# Patient Record
Sex: FEMALE | Race: WHITE | NOT HISPANIC OR LATINO | Employment: OTHER | URBAN - NONMETROPOLITAN AREA
[De-identification: names, ages, dates, MRNs, and addresses within clinical notes are randomized per-mention and may not be internally consistent; named-entity substitution may affect disease eponyms.]

---

## 2022-11-03 NOTE — H&P (VIEW-ONLY)
Office New Patient History and Physical:     Referring Provider: Nolberto Pelayo AP*    Chief Complaint   Patient presents with   • Follow-up        Subjective .     History of present illness:  Tiffanie Pelayo is a 77 y.o. female who presented to her PCP with a right breast tumor x 10 years which is enlarging, painful and has drainage. She reports she does not like doctors and has had bad experiences at both hospitals in [place] so she did not seek help. She thought she could control it with diet and does not like conventional breast cancer treatment. She reports it has more than doubled in size in the past few months. She was looking for a homeopathic doctor and found one who recommended thermography. She had that done. She has serosanguinous drainage; enough to wear a pad. She denies pain in the breast. She does not want any chemotherapy or radiation.     Breast History information:   Prior abnormal mammograms: none   Prior breast biopsies: none   Palpable breast masses: yes see above   Nipple discharge: yes see above   Age at first menses: 14  Age at menopause: mid 50s   Number of biological children: 3  Age at first birth: 22  Years on birth control: 10-15 years   Years on HRT: none   Family history of breast cancer: none   Smoking History: non-smoker   Alcohol use: none   BMI: 31     History  History reviewed. No pertinent past medical history.,   Past Surgical History:   Procedure Laterality Date   •  SECTION     • GALLBLADDER SURGERY     • TONSILLECTOMY     , History reviewed. No pertinent family history.,   Social History     Tobacco Use   • Smoking status: Never   • Smokeless tobacco: Never   Vaping Use   • Vaping Use: Never used   Substance Use Topics   • Alcohol use: Never   • Drug use: Never   , (Not in a hospital admission)   and Allergies:  Keflex [cephalexin]    Current Outpatient Medications:   •  acetaminophen (TYLENOL) 160 MG/5ML solution, Take 15 mg/kg by mouth Every 4 (Four) Hours As  "Needed for Mild Pain., Disp: , Rfl:   •  diazePAM (Valium) 2 MG tablet, Take 1 tablet by mouth 1 (One) Time As Needed (before PET scan) for up to 1 dose., Disp: 1 tablet, Rfl: 0    Objective     Vital Signs   /93 (BP Location: Left arm, Patient Position: Sitting, Cuff Size: Adult)   Pulse 76   Ht 152.4 cm (60\")   Wt 72.1 kg (159 lb)   BMI 31.05 kg/m²      Physical Exam:  General appearance - alert, well appearing, and in no distress  Mental status - alert, oriented to person, place, and time  Eyes - pupils equal and reactive, extraocular eye movements intact  Neck - supple, no significant adenopathy  Chest - no tachypnea, retractions or cyanosis  Heart - regular rate   Abdomen - soft, nontender, nondistended, no masses or organomegaly  Neurological - alert, oriented, normal speech, no focal findings or movement disorder noted  Musculoskeletal - no joint tenderness, deformity or swelling  Breast Exam: Left breast without obvious deformities with everted nipple. When examined in the supine position with the ipsilateral arm above the head there are no left breast masses. The right breast has a very large exophytic mass that almost completely replaces the right breast, it is red and purple in coloration. IT is fixed to the chest wall. There is palpable right axillary adenopathy. There are no palpable axillary nor supraclavicular lymph nodes on the left.         Results Review:    The following data was reviewed by: Griselda Arteaga MD on 11/04/2022:     PROGRESS NOTES - SCAN - PN(MAGNUS MOORE, CORBY, FNP-BC)10.17.22 (10/17/2022)    Assessment & Plan       Diagnoses and all orders for this visit:    1. Mass of right breast, unspecified quadrant (Primary)  -     NM PET/CT Skull Base to Mid Thigh; Future  -     diazePAM (Valium) 2 MG tablet; Take 1 tablet by mouth 1 (One) Time As Needed (before PET scan) for up to 1 dose.  Dispense: 1 tablet; Refill: 0  -     Case Request; Standing  -     CBC (No diff); " "Future  -     Comprehensive metabolic panel; Future  -     ECG 12 Lead; Future  -     XR chest 1 vw; Future  -     heparin (porcine) 5000 UNIT/ML injection 5,000 Units  -     clindamycin (CLEOCIN) 600 mg in dextrose (D5W) 5 % 100 mL IVPB  -     Case Request    2. 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, unspecified estrogen receptor status (HCC)  -     NM PET/CT Skull Base to Mid Thigh; Future  -     Case Request; Standing  -     CBC (No diff); Future  -     Comprehensive metabolic panel; Future  -     ECG 12 Lead; Future  -     XR chest 1 vw; Future  -     heparin (porcine) 5000 UNIT/ML injection 5,000 Units  -     clindamycin (CLEOCIN) 600 mg in dextrose (D5W) 5 % 100 mL IVPB  -     Case Request    Other orders  -     Follow Anesthesia Guidelines / Protocol; Future  -     Obtain Informed Consent; Future  -     Provide NPO Instructions to Patient; Future  -     Chlorhexidine Skin Prep; Future  -     Follow Anesthesia Guidelines / Protocol; Standing  -     Verify / Perform Chlorhexidine Skin Prep; Standing  -     Verify / Perform Chlorhexidine Skin Prep if Indicated (If Not Already Completed); Standing  -     Instructions on coughing, deep breathing, and incentive spirometry.; Standing  -     Oxygen Therapy-; Standing  -     Notify physician (specify); Standing         Tiffanie Pelayo is a 77 y.o. female with a large right exophytic breast mass that is adherent to the chest wall. This is highly concerning for long standing breast cancer. She explicitly states she does not trust modern medical therapies or physicians. She needs a metastatic work up however she does not want a \"lot of scans.\" She has agreed to get one scan. We will proceed with a PET scan. If she has metastatic disease, we will plan for a salvage mastectomy with wound vac placement as it is exophytic and bleeding. If she has no evidence of metastatic disease but does have enlarged lymph nodes, we will plan to proceed with modified " radical mastectomy with wound vac placement. She says she will not take chemotherapy or radiation therapy but she will consider anti-endocrine therapy. Ideally we would have a pre-op biopsy, however she only wants one procedure. This is a very complicated case. She is scheduled for a PET scan, CBC, CMP, CXR and EKG. She is scheduled for surgery on 11/16/22 and the exact surgery will depend on the results of the PET scan. She is in agreement with this plan.     BMI is >= 30 and <35. (Class 1 Obesity). The following options were offered after discussion;: weight loss educational material (shared in after visit summary)      Griselda Arteaga MD  11/04/22  11:26 CDT

## 2022-11-03 NOTE — PROGRESS NOTES
Office New Patient History and Physical:     Referring Provider: Nolberto Pelayo AP*    Chief Complaint   Patient presents with   • Follow-up        Subjective .     History of present illness:  Tiffanie Pelayo is a 77 y.o. female who presented to her PCP with a right breast tumor x 10 years which is enlarging, painful and has drainage. She reports she does not like doctors and has had bad experiences at both hospitals in [place] so she did not seek help. She thought she could control it with diet and does not like conventional breast cancer treatment. She reports it has more than doubled in size in the past few months. She was looking for a homeopathic doctor and found one who recommended thermography. She had that done. She has serosanguinous drainage; enough to wear a pad. She denies pain in the breast. She does not want any chemotherapy or radiation.     Breast History information:   Prior abnormal mammograms: none   Prior breast biopsies: none   Palpable breast masses: yes see above   Nipple discharge: yes see above   Age at first menses: 14  Age at menopause: mid 50s   Number of biological children: 3  Age at first birth: 22  Years on birth control: 10-15 years   Years on HRT: none   Family history of breast cancer: none   Smoking History: non-smoker   Alcohol use: none   BMI: 31     History  History reviewed. No pertinent past medical history.,   Past Surgical History:   Procedure Laterality Date   •  SECTION     • GALLBLADDER SURGERY     • TONSILLECTOMY     , History reviewed. No pertinent family history.,   Social History     Tobacco Use   • Smoking status: Never   • Smokeless tobacco: Never   Vaping Use   • Vaping Use: Never used   Substance Use Topics   • Alcohol use: Never   • Drug use: Never   , (Not in a hospital admission)   and Allergies:  Keflex [cephalexin]    Current Outpatient Medications:   •  acetaminophen (TYLENOL) 160 MG/5ML solution, Take 15 mg/kg by mouth Every 4 (Four) Hours As  "Needed for Mild Pain., Disp: , Rfl:   •  diazePAM (Valium) 2 MG tablet, Take 1 tablet by mouth 1 (One) Time As Needed (before PET scan) for up to 1 dose., Disp: 1 tablet, Rfl: 0    Objective     Vital Signs   /93 (BP Location: Left arm, Patient Position: Sitting, Cuff Size: Adult)   Pulse 76   Ht 152.4 cm (60\")   Wt 72.1 kg (159 lb)   BMI 31.05 kg/m²      Physical Exam:  General appearance - alert, well appearing, and in no distress  Mental status - alert, oriented to person, place, and time  Eyes - pupils equal and reactive, extraocular eye movements intact  Neck - supple, no significant adenopathy  Chest - no tachypnea, retractions or cyanosis  Heart - regular rate   Abdomen - soft, nontender, nondistended, no masses or organomegaly  Neurological - alert, oriented, normal speech, no focal findings or movement disorder noted  Musculoskeletal - no joint tenderness, deformity or swelling  Breast Exam: Left breast without obvious deformities with everted nipple. When examined in the supine position with the ipsilateral arm above the head there are no left breast masses. The right breast has a very large exophytic mass that almost completely replaces the right breast, it is red and purple in coloration. IT is fixed to the chest wall. There is palpable right axillary adenopathy. There are no palpable axillary nor supraclavicular lymph nodes on the left.         Results Review:    The following data was reviewed by: Griselda Arteaga MD on 11/04/2022:     PROGRESS NOTES - SCAN - PN(MAGNUS MOORE, CORBY, FNP-BC)10.17.22 (10/17/2022)    Assessment & Plan       Diagnoses and all orders for this visit:    1. Mass of right breast, unspecified quadrant (Primary)  -     NM PET/CT Skull Base to Mid Thigh; Future  -     diazePAM (Valium) 2 MG tablet; Take 1 tablet by mouth 1 (One) Time As Needed (before PET scan) for up to 1 dose.  Dispense: 1 tablet; Refill: 0  -     Case Request; Standing  -     CBC (No diff); " "Future  -     Comprehensive metabolic panel; Future  -     ECG 12 Lead; Future  -     XR chest 1 vw; Future  -     heparin (porcine) 5000 UNIT/ML injection 5,000 Units  -     clindamycin (CLEOCIN) 600 mg in dextrose (D5W) 5 % 100 mL IVPB  -     Case Request    2. 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, unspecified estrogen receptor status (HCC)  -     NM PET/CT Skull Base to Mid Thigh; Future  -     Case Request; Standing  -     CBC (No diff); Future  -     Comprehensive metabolic panel; Future  -     ECG 12 Lead; Future  -     XR chest 1 vw; Future  -     heparin (porcine) 5000 UNIT/ML injection 5,000 Units  -     clindamycin (CLEOCIN) 600 mg in dextrose (D5W) 5 % 100 mL IVPB  -     Case Request    Other orders  -     Follow Anesthesia Guidelines / Protocol; Future  -     Obtain Informed Consent; Future  -     Provide NPO Instructions to Patient; Future  -     Chlorhexidine Skin Prep; Future  -     Follow Anesthesia Guidelines / Protocol; Standing  -     Verify / Perform Chlorhexidine Skin Prep; Standing  -     Verify / Perform Chlorhexidine Skin Prep if Indicated (If Not Already Completed); Standing  -     Instructions on coughing, deep breathing, and incentive spirometry.; Standing  -     Oxygen Therapy-; Standing  -     Notify physician (specify); Standing         Tiffanie Pelayo is a 77 y.o. female with a large right exophytic breast mass that is adherent to the chest wall. This is highly concerning for long standing breast cancer. She explicitly states she does not trust modern medical therapies or physicians. She needs a metastatic work up however she does not want a \"lot of scans.\" She has agreed to get one scan. We will proceed with a PET scan. If she has metastatic disease, we will plan for a salvage mastectomy with wound vac placement as it is exophytic and bleeding. If she has no evidence of metastatic disease but does have enlarged lymph nodes, we will plan to proceed with modified " radical mastectomy with wound vac placement. She says she will not take chemotherapy or radiation therapy but she will consider anti-endocrine therapy. Ideally we would have a pre-op biopsy, however she only wants one procedure. This is a very complicated case. She is scheduled for a PET scan, CBC, CMP, CXR and EKG. She is scheduled for surgery on 11/16/22 and the exact surgery will depend on the results of the PET scan. She is in agreement with this plan.     BMI is >= 30 and <35. (Class 1 Obesity). The following options were offered after discussion;: weight loss educational material (shared in after visit summary)      Griselda Arteaga MD  11/04/22  11:26 CDT

## 2022-11-04 ENCOUNTER — OFFICE VISIT (OUTPATIENT)
Dept: SURGERY | Facility: CLINIC | Age: 77
End: 2022-11-04

## 2022-11-04 VITALS
WEIGHT: 159 LBS | DIASTOLIC BLOOD PRESSURE: 93 MMHG | SYSTOLIC BLOOD PRESSURE: 179 MMHG | HEART RATE: 76 BPM | BODY MASS INDEX: 31.22 KG/M2 | HEIGHT: 60 IN

## 2022-11-04 DIAGNOSIS — C50.611 MALIGNANT NEOPLASM OF AXILLARY TAIL OF RIGHT FEMALE BREAST, UNSPECIFIED ESTROGEN RECEPTOR STATUS: ICD-10-CM

## 2022-11-04 DIAGNOSIS — N63.10 MASS OF RIGHT BREAST, UNSPECIFIED QUADRANT: Primary | ICD-10-CM

## 2022-11-04 PROCEDURE — 99204 OFFICE O/P NEW MOD 45 MIN: CPT | Performed by: STUDENT IN AN ORGANIZED HEALTH CARE EDUCATION/TRAINING PROGRAM

## 2022-11-04 RX ORDER — HEPARIN SODIUM 5000 [USP'U]/ML
5000 INJECTION, SOLUTION INTRAVENOUS; SUBCUTANEOUS ONCE
Status: CANCELLED | OUTPATIENT
Start: 2022-11-04 | End: 2022-11-04

## 2022-11-04 RX ORDER — ACETAMINOPHEN 160 MG/5ML
15 SOLUTION ORAL EVERY 4 HOURS PRN
COMMUNITY
End: 2023-01-23

## 2022-11-04 RX ORDER — DIAZEPAM 2 MG/1
2 TABLET ORAL ONCE AS NEEDED
Qty: 1 TABLET | Refills: 0 | Status: SHIPPED | OUTPATIENT
Start: 2022-11-04 | End: 2022-11-09

## 2022-11-04 NOTE — PATIENT INSTRUCTIONS
Surgery is scheduled for 11/16 at 1200  Prework is scheduled for 11/9 at 115   Clear liquids day of surgery until 1000am   No Aspirin, Vitamins or Blood Thinners 5 days prior to surgery.  Please report to the hospital main registation for check in on both days.     PET Scan  11/7     1030 am  [facility]     BMI for Adults  What is BMI?  Body mass index (BMI) is a number that is calculated from a person's weight and height. BMI can help estimate how much of a person's weight is composed of fat. BMI does not measure body fat directly. Rather, it is an alternative to procedures that directly measure body fat, which can be difficult and expensive.  BMI can help identify people who may be at higher risk for certain medical problems.  What are BMI measurements used for?  BMI is used as a screening tool to identify possible weight problems. It helps determine whether a person is obese, overweight, a healthy weight, or underweight.  BMI is useful for:  Identifying a weight problem that may be related to a medical condition or may increase the risk for medical problems.  Promoting changes, such as changes in diet and exercise, to help reach a healthy weight. BMI screening can be repeated to see if these changes are working.  How is BMI calculated?  BMI involves measuring your weight in relation to your height. Both height and weight are measured, and the BMI is calculated from those numbers. This can be done either in English ([place] or metric measurements. Note that charts and online BMI calculators are available to help you find your BMI quickly and easily without having to do these calculations yourself.  To calculate your BMI in English ([place].S[place] measurements:    Measure your weight in pounds (lb).  Multiply the number of pounds by 703.  For example, for a person who weighs 180 lb, multiply that number by 703, which equals 126,540.  Measure your height in inches. Then multiply that number by itself to get a  "measurement called \"inches squared.\"  For example, for a person who is 70 inches tall, the \"inches squared\" measurement is 70 inches x 70 inches, which equals 4,900 inches squared.  Divide the total from step 2 (number of lb x 703) by the total from step 3 (inches squared): 126,540 ÷ 4,900 = 25.8. This is your BMI.    To calculate your BMI in metric measurements:  Measure your weight in kilograms (kg).  Measure your height in meters (m). Then multiply that number by itself to get a measurement called \"meters squared.\"  For example, for a person who is 1.75 m tall, the \"meters squared\" measurement is 1.75 m x 1.75 m, which is equal to 3.1 meters squared.  Divide the number of kilograms (your weight) by the meters squared number. In this example: 70 ÷ 3.1 = 22.6. This is your BMI.  What do the results mean?  BMI charts are used to identify whether you are underweight, normal weight, overweight, or obese. The following guidelines will be used:  Underweight: BMI less than 18.5.  Normal weight: BMI between 18.5 and 24.9.  Overweight: BMI between 25 and 29.9.  Obese: BMI of 30 or above.  Keep these notes in mind:  Weight includes both fat and muscle, so someone with a muscular build, such as an athlete, may have a BMI that is higher than 24.9. In cases like these, BMI is not an accurate measure of body fat.  To determine if excess body fat is the cause of a BMI of 25 or higher, further assessments may need to be done by a health care provider.  BMI is usually interpreted in the same way for men and women.  Where to find more information  For more information about BMI, including tools to quickly calculate your BMI, go to these websites:  Centers for Disease Control and Prevention: www.cdc.gov  American Heart Association: www.heart.org  National Heart, Lung, and Blood [place]: www.nhlbi.nih.gov  Summary  Body mass index (BMI) is a number that is calculated from a person's weight and height.  BMI may help estimate how " much of a person's weight is composed of fat. BMI can help identify those who may be at higher risk for certain medical problems.  BMI can be measured using English measurements or metric measurements.  BMI charts are used to identify whether you are underweight, normal weight, overweight, or obese.  This information is not intended to replace advice given to you by your health care provider. Make sure you discuss any questions you have with your health care provider.  Document Revised: 09/09/2020 Document Reviewed: 07/17/2020  Elsevier Patient Education © 2021 [employer]

## 2022-11-07 ENCOUNTER — HOSPITAL ENCOUNTER (OUTPATIENT)
Dept: CT IMAGING | Facility: HOSPITAL | Age: 77
Discharge: HOME | End: 2022-11-07

## 2022-11-07 DIAGNOSIS — C50.611 MALIGNANT NEOPLASM OF AXILLARY TAIL OF RIGHT FEMALE BREAST, UNSPECIFIED ESTROGEN RECEPTOR STATUS: ICD-10-CM

## 2022-11-07 DIAGNOSIS — N63.10 MASS OF RIGHT BREAST, UNSPECIFIED QUADRANT: ICD-10-CM

## 2022-11-07 PROCEDURE — A9552 F18 FDG: HCPCS | Performed by: STUDENT IN AN ORGANIZED HEALTH CARE EDUCATION/TRAINING PROGRAM

## 2022-11-07 PROCEDURE — 78815 PET IMAGE W/CT SKULL-THIGH: CPT

## 2022-11-07 PROCEDURE — 0 FLUDEOXYGLUCOSE F18 SOLUTION: Performed by: STUDENT IN AN ORGANIZED HEALTH CARE EDUCATION/TRAINING PROGRAM

## 2022-11-07 RX ADMIN — FLUDEOXYGLUCOSE F18 1 DOSE: 300 INJECTION INTRAVENOUS at 11:13

## 2022-11-09 ENCOUNTER — PRE-ADMISSION TESTING (OUTPATIENT)
Dept: PREADMISSION TESTING | Facility: HOSPITAL | Age: 77
End: 2022-11-09

## 2022-11-09 ENCOUNTER — APPOINTMENT (OUTPATIENT)
Dept: CT IMAGING | Facility: HOSPITAL | Age: 77
End: 2022-11-09

## 2022-11-09 ENCOUNTER — HOSPITAL ENCOUNTER (OUTPATIENT)
Dept: GENERAL RADIOLOGY | Facility: HOSPITAL | Age: 77
Discharge: HOME | End: 2022-11-09

## 2022-11-09 VITALS
HEIGHT: 60 IN | WEIGHT: 158.95 LBS | DIASTOLIC BLOOD PRESSURE: 83 MMHG | OXYGEN SATURATION: 99 % | HEART RATE: 76 BPM | RESPIRATION RATE: 18 BRPM | SYSTOLIC BLOOD PRESSURE: 179 MMHG | BODY MASS INDEX: 31.21 KG/M2

## 2022-11-09 DIAGNOSIS — C50.611 MALIGNANT NEOPLASM OF AXILLARY TAIL OF RIGHT FEMALE BREAST, UNSPECIFIED ESTROGEN RECEPTOR STATUS: ICD-10-CM

## 2022-11-09 DIAGNOSIS — N63.10 MASS OF RIGHT BREAST, UNSPECIFIED QUADRANT: ICD-10-CM

## 2022-11-09 LAB
ALBUMIN SERPL-MCNC: 4.5 G/DL (ref 3.5–5.2)
ALBUMIN/GLOB SERPL: 1.7 G/DL
ALP SERPL-CCNC: 83 U/L (ref 39–117)
ALT SERPL W P-5'-P-CCNC: 14 U/L (ref 1–33)
ANION GAP SERPL CALCULATED.3IONS-SCNC: 7 MMOL/L (ref 5–15)
AST SERPL-CCNC: 19 U/L (ref 1–32)
BILIRUB SERPL-MCNC: 0.3 MG/DL (ref 0–1.2)
BUN SERPL-MCNC: 11 MG/DL (ref 8–23)
BUN/CREAT SERPL: 26.8 (ref 7–25)
CALCIUM SPEC-SCNC: 9.5 MG/DL (ref 8.6–10.5)
CHLORIDE SERPL-SCNC: 106 MMOL/L (ref 98–107)
CO2 SERPL-SCNC: 29 MMOL/L (ref 22–29)
CREAT SERPL-MCNC: 0.41 MG/DL (ref 0.57–1)
DEPRECATED RDW RBC AUTO: 43.1 FL (ref 37–54)
EGFRCR SERPLBLD CKD-EPI 2021: 102.7 ML/MIN/1.73
ERYTHROCYTE [DISTWIDTH] IN BLOOD BY AUTOMATED COUNT: 12.8 % (ref 12.3–15.4)
GLOBULIN UR ELPH-MCNC: 2.6 GM/DL
GLUCOSE SERPL-MCNC: 101 MG/DL (ref 65–99)
HCT VFR BLD AUTO: 39.6 % (ref 34–46.6)
HGB BLD-MCNC: 13.1 G/DL (ref 12–15.9)
MCH RBC QN AUTO: 30.4 PG (ref 26.6–33)
MCHC RBC AUTO-ENTMCNC: 33.1 G/DL (ref 31.5–35.7)
MCV RBC AUTO: 91.9 FL (ref 79–97)
PLATELET # BLD AUTO: 186 10*3/MM3 (ref 140–450)
PMV BLD AUTO: 9.8 FL (ref 6–12)
POTASSIUM SERPL-SCNC: 3.9 MMOL/L (ref 3.5–5.2)
PROT SERPL-MCNC: 7.1 G/DL (ref 6–8.5)
RBC # BLD AUTO: 4.31 10*6/MM3 (ref 3.77–5.28)
SODIUM SERPL-SCNC: 142 MMOL/L (ref 136–145)
WBC NRBC COR # BLD: 6.4 10*3/MM3 (ref 3.4–10.8)

## 2022-11-09 PROCEDURE — 85027 COMPLETE CBC AUTOMATED: CPT

## 2022-11-09 PROCEDURE — 36415 COLL VENOUS BLD VENIPUNCTURE: CPT

## 2022-11-09 PROCEDURE — 80053 COMPREHEN METABOLIC PANEL: CPT

## 2022-11-09 PROCEDURE — 93005 ELECTROCARDIOGRAM TRACING: CPT

## 2022-11-09 PROCEDURE — 93010 ELECTROCARDIOGRAM REPORT: CPT | Performed by: INTERNAL MEDICINE

## 2022-11-09 PROCEDURE — 71045 X-RAY EXAM CHEST 1 VIEW: CPT

## 2022-11-09 RX ORDER — VITAMIN B COMPLEX
1 CAPSULE ORAL DAILY
COMMUNITY

## 2022-11-09 RX ORDER — BETA-GLUCAN, (1-3) (1-4) 70 %
3 POWDER (GRAM) MISCELLANEOUS DAILY
COMMUNITY

## 2022-11-09 NOTE — DISCHARGE INSTRUCTIONS
Before you come to the hospital        Arrival time: AS DIRECTED BY OFFICE     YOU MAY TAKE THE FOLLOWING MEDICATION(S) THE MORNING OF SURGERY WITH A SIP OF WATER: NONE AS DIRECTED BY PROVIDER           ALL OTHER HOME MEDICATION CHECK WITH YOUR PHYSICIAN (especially if you are taking diabetes medicines or blood thinners)    Do not take any Erectile Dysfunction medications (EX: CIALIS, VIAGRA) 24 hours prior to surgery.      If you were given and instructed to use a germ- killing soap, use as directed the night before surgery and again the morning of surgery or as directed by your surgeon.    (See attached information for How to Use Chlorhexidine for Bathing if applicable.)            Eating and drinking restrictions prior to scheduled arrival time    2 Hours before arrival time STOP   Drinking Clear liquids (water, apple juice-no pulp)     6 Hours before arrival time STOP   Milk or drinks that contain milk, full liquids    6 Hours before arrival time STOP   Light meals or foods, such as toast or cereal    8 Hours before arrival time STOP   Heavy foods, such as meat, fried foods, or fatty foods    (It is extremely important that you follow these guidelines to prevent delay or cancelation of your procedure)     Clear Liquids  Water and flavored water                                                                      Clear Fruit juices, such as cranberry juice and apple juice.  Black coffee (NO cream of any kind, including powdered).  Plain tea  Clear bouillon or broth.  Flavored gelatin.  Soda.  Gatorade or Powerade.  Full liquid examples  Juices that have pulp.  Frozen ice pops that contain fruit pieces.  Coffee with creamer  Milk.  Yogurt.                MANAGING PAIN AFTER SURGERY    We know you are probably wondering what your pain will be like after surgery.  Following surgery it is unrealistic to expect you will not have pain.   Pain is how our bodies let us know that something is wrong or cautions us to be  careful.  That said, our goal is to make your pain tolerable.    Methods we may use to treat your pain include (oral or IV medications, PCAs, epidurals, nerve blocks, etc.)   While some procedures require IV pain medications for a short time after surgery, transitioning to pain medications by mouth allows for better management of pain.   Your nurse will encourage you to take oral pain medications whenever possible.  IV medications work almost immediately, but only last a short while.  Taking medications by mouth allows for a more constant level of medication in your blood stream for a longer period of time.      Once your pain is out of control it is harder to get back under control.  It is important you are aware when your next dose of pain medication is due.  If you are admitted, your nurse may write the time of your next dose on the white board in your room to help you remember.      We are interested in your pain and encourage you to inform us about aggravating factors during your visit.   Many times a simple repositioning every few hours can make a big difference.    If your physician says it is okay, do not let your pain prevent you from getting out of bed. Be sure to call your nurse for assistance prior to getting up so you do not fall.      Before surgery, please decide your tolerable pain goal.  These faces help describe the pain ratings we use on a 0-10 scale.   Be prepared to tell us your goal and whether or not you take pain or anxiety medications at home.          Preparing for Surgery  Preparing for surgery is an important part of your care. It can make things go more smoothly and help you avoid complications. The steps leading up to surgery may vary among hospitals. Follow all instructions given to you by your health care providers. Ask questions if you do not understand something. Talk about any concerns that you have.  Here are some questions to consider asking before your surgery:  If my surgery is  not an emergency (is elective), when would be the best time to have the surgery?  What arrangements do I need to make for work, home, or school?  What will my recovery be like? How long will it be before I can return to normal activities?  Will I need to prepare my home? Will I need to arrange care for me or my children?  Should I expect to have pain after surgery? What are my pain management options? Are there nonmedical options that I can try for pain?  Tell a health care provider about:  Any allergies you have.  All medicines you are taking, including vitamins, herbs, eye drops, creams, and over-the-counter medicines.  Any problems you or family members have had with anesthetic medicines.  Any blood disorders you have.  Any surgeries you have had.  Any medical conditions you have.  Whether you are pregnant or may be pregnant.  What are the risks?  The risks and complications of surgery depend on the specific procedure that you have. Discuss all the risks with your health care providers before your surgery. Ask about common surgical complications, which may include:  Infection.  Bleeding or a need for blood replacement (transfusion).  Allergic reactions to medicines.  Damage to surrounding nerves, tissues, or structures.  A blood clot.  Scarring.  Failure of the surgery to correct the problem.  Follow these instructions before the procedure:  Several days or weeks before your procedure  You may have a physical exam by your primary health care provider to make sure it is safe for you to have surgery.  You may have testing. This may include a chest X-ray, blood and urine tests, electrocardiogram (ECG), or other testing.  Ask your health care provider about:  Changing or stopping your regular medicines. This is especially important if you are taking diabetes medicines or blood thinners.  Taking medicines such as aspirin and ibuprofen. These medicines can thin your blood. Do not take these medicines unless your health  care provider tells you to take them.  Taking over-the-counter medicines, vitamins, herbs, and supplements.  Do not use any products that contain nicotine or tobacco, such as cigarettes and e-cigarettes. If you need help quitting, ask your health care provider.  Avoid alcohol.  Ask your health care provider if there are exercises you can do to prepare for surgery.  Eat a healthy diet.   Plan to have someone take you home from the hospital or clinic.  Plan to have a responsible adult care for you for at least 24 hours after you leave the hospital or clinic. This is important.  The day before your procedure  You may be given antibiotic medicine to take by mouth to help prevent infection. Take it as told by your health care provider.  You may be asked to shower with a germ-killing soap.  Follow instructions from your health care provider about eating and drinking restrictions. This includes gum, mints and hard candy.  Pack comfortable clothes according to your procedure.   The day of your procedure  You may need to take another shower with a germ-killing soap before you leave home in the morning.  With a small sip of water, take only the medicines that you are told to take.  Remove all jewelry including rings.   Leave anything you consider valuable at home except hearing aids if needed.  Do not wear any makeup, nail polish, powder, deodorant, lotion, hair accessories, or anything on your skin or body except your clothes.  If you will be staying in the hospital, bring a case to hold your glasses, contacts, or dentures. You may also want to bring your robe and non-skid footwear.  If you wear oxygen at home, bring it with you the day of surgery.  If instructed by your health care provider, bring your sleep apnea device with you on the day of your surgery (if this applies to you).  You may want to leave your suitcase and sleep apnea device in the car until after surgery.   Arrive at the hospital as scheduled.  Bring a  friend or family member with you who can help to answer questions and be present while you meet with your health care provider.  At the hospital  When you arrive at the hospital:  Go to registration located at the main entrance of the hospital. You will be registered and given a beeper and a sticker sheet. Take the stickers to the Outpatient nurses desk and place in the black tray. This is to notify staff that you have arrived. Then return to the lobby to wait.   When your beeper lights up and vibrates proceed through the double doors, under the stairs, and a member of the Outpatient Surgery staff will escort you to your preoperative room.  You may have to wear compression sleeves. These help to prevent blood clots and reduce swelling in your legs.  An IV may be inserted into one of your veins.              In the operating room, you may be given one or more of the following:        A medicine to help you relax (sedative).        A medicine to numb the area (local anesthetic).        A medicine to make you fall asleep (general anesthetic).        A medicine that is injected into an area of your body to numb everything below the                      injection site (regional anesthetic).  You may be given an antibiotic through your IV to help prevent infection.  Your surgical site will be marked or identified.    Contact a health care provider if you:  Develop a fever of more than 100.4°F (38°C) or other feelings of illness during the 48 hours before your surgery.  Have symptoms that get worse.  Have questions or concerns about your surgery.  Summary  Preparing for surgery can make the procedure go more smoothly and lower your risk of complications.  Before surgery, make a list of questions and concerns to discuss with your surgeon. Ask about the risks and possible complications.  In the days or weeks before your surgery, follow all instructions from your health care provider. You may need to stop smoking, avoid  alcohol, follow eating restrictions, and change or stop your regular medicines.  Contact your surgeon if you develop a fever or other signs of illness during the few days before your surgery.  This information is not intended to replace advice given to you by your health care provider. Make sure you discuss any questions you have with your health care provider.  Document Revised: 12/21/2018 Document Reviewed: 10/23/2018  Elsevier Patient Education © 2021 [employer]

## 2022-11-10 LAB
QT INTERVAL: 372 MS
QTC INTERVAL: 429 MS

## 2022-11-16 ENCOUNTER — HOSPITAL ENCOUNTER (OUTPATIENT)
Facility: HOSPITAL | Age: 77
Discharge: HOME | End: 2022-11-18
Attending: STUDENT IN AN ORGANIZED HEALTH CARE EDUCATION/TRAINING PROGRAM | Admitting: STUDENT IN AN ORGANIZED HEALTH CARE EDUCATION/TRAINING PROGRAM

## 2022-11-16 ENCOUNTER — ANESTHESIA (OUTPATIENT)
Dept: PERIOP | Facility: HOSPITAL | Age: 77
End: 2022-11-16

## 2022-11-16 ENCOUNTER — ANESTHESIA EVENT (OUTPATIENT)
Dept: PERIOP | Facility: HOSPITAL | Age: 77
End: 2022-11-16

## 2022-11-16 DIAGNOSIS — C50.611 MALIGNANT NEOPLASM OF AXILLARY TAIL OF RIGHT FEMALE BREAST, UNSPECIFIED ESTROGEN RECEPTOR STATUS: ICD-10-CM

## 2022-11-16 DIAGNOSIS — N63.10 MASS OF RIGHT BREAST, UNSPECIFIED QUADRANT: ICD-10-CM

## 2022-11-16 DIAGNOSIS — T14.8XXA OPEN WOUND: ICD-10-CM

## 2022-11-16 PROCEDURE — 25010000002 HYDROMORPHONE PER 4 MG: Performed by: STUDENT IN AN ORGANIZED HEALTH CARE EDUCATION/TRAINING PROGRAM

## 2022-11-16 PROCEDURE — 19307 MAST MOD RAD: CPT | Performed by: STUDENT IN AN ORGANIZED HEALTH CARE EDUCATION/TRAINING PROGRAM

## 2022-11-16 PROCEDURE — 63710000001 OXYCODONE 5 MG TABLET: Performed by: STUDENT IN AN ORGANIZED HEALTH CARE EDUCATION/TRAINING PROGRAM

## 2022-11-16 PROCEDURE — 25010000002 DEXAMETHASONE PER 1 MG: Performed by: NURSE ANESTHETIST, CERTIFIED REGISTERED

## 2022-11-16 PROCEDURE — 63710000001 FAMOTIDINE 20 MG TABLET: Performed by: STUDENT IN AN ORGANIZED HEALTH CARE EDUCATION/TRAINING PROGRAM

## 2022-11-16 PROCEDURE — 94761 N-INVAS EAR/PLS OXIMETRY MLT: CPT

## 2022-11-16 PROCEDURE — 25010000002 FENTANYL CITRATE (PF) 50 MCG/ML SOLUTION: Performed by: ANESTHESIOLOGY

## 2022-11-16 PROCEDURE — 88361 TUMOR IMMUNOHISTOCHEM/COMPUT: CPT

## 2022-11-16 PROCEDURE — 25010000002 EPINEPHRINE PER 0.1 MG: Performed by: STUDENT IN AN ORGANIZED HEALTH CARE EDUCATION/TRAINING PROGRAM

## 2022-11-16 PROCEDURE — G0378 HOSPITAL OBSERVATION PER HR: HCPCS

## 2022-11-16 PROCEDURE — 25010000002 HEPARIN (PORCINE) PER 1000 UNITS: Performed by: STUDENT IN AN ORGANIZED HEALTH CARE EDUCATION/TRAINING PROGRAM

## 2022-11-16 PROCEDURE — 94799 UNLISTED PULMONARY SVC/PX: CPT

## 2022-11-16 PROCEDURE — 25010000002 MIDAZOLAM PER 1 MG: Performed by: ANESTHESIOLOGY

## 2022-11-16 PROCEDURE — A9270 NON-COVERED ITEM OR SERVICE: HCPCS | Performed by: ANESTHESIOLOGY

## 2022-11-16 PROCEDURE — 63710000001 DOCUSATE SODIUM 100 MG CAPSULE: Performed by: STUDENT IN AN ORGANIZED HEALTH CARE EDUCATION/TRAINING PROGRAM

## 2022-11-16 PROCEDURE — 25010000002 PROPOFOL 10 MG/ML EMULSION: Performed by: NURSE ANESTHETIST, CERTIFIED REGISTERED

## 2022-11-16 PROCEDURE — 25010000002 ONDANSETRON PER 1 MG: Performed by: NURSE ANESTHETIST, CERTIFIED REGISTERED

## 2022-11-16 PROCEDURE — 25010000002 DEXAMETHASONE PER 1 MG: Performed by: ANESTHESIOLOGY

## 2022-11-16 PROCEDURE — A9270 NON-COVERED ITEM OR SERVICE: HCPCS | Performed by: STUDENT IN AN ORGANIZED HEALTH CARE EDUCATION/TRAINING PROGRAM

## 2022-11-16 PROCEDURE — 63710000001 IBUPROFEN 600 MG TABLET: Performed by: ANESTHESIOLOGY

## 2022-11-16 PROCEDURE — 97606 NEG PRS WND THER DME>50 SQCM: CPT | Performed by: STUDENT IN AN ORGANIZED HEALTH CARE EDUCATION/TRAINING PROGRAM

## 2022-11-16 PROCEDURE — 25010000002 HYDROMORPHONE 1 MG/ML SOLUTION: Performed by: NURSE ANESTHETIST, CERTIFIED REGISTERED

## 2022-11-16 PROCEDURE — 25010000002 FENTANYL CITRATE (PF) 100 MCG/2ML SOLUTION: Performed by: NURSE ANESTHETIST, CERTIFIED REGISTERED

## 2022-11-16 PROCEDURE — 88309 TISSUE EXAM BY PATHOLOGIST: CPT | Performed by: STUDENT IN AN ORGANIZED HEALTH CARE EDUCATION/TRAINING PROGRAM

## 2022-11-16 PROCEDURE — 63710000001 ACETAMINOPHEN 500 MG TABLET: Performed by: STUDENT IN AN ORGANIZED HEALTH CARE EDUCATION/TRAINING PROGRAM

## 2022-11-16 PROCEDURE — 63710000001 OXYCODONE-ACETAMINOPHEN 5-325 MG TABLET: Performed by: ANESTHESIOLOGY

## 2022-11-16 DEVICE — LIGACLIP MCA MULTIPLE CLIP APPLIERS, 30 MEDIUM CLIPS
Type: IMPLANTABLE DEVICE | Site: BREAST | Status: FUNCTIONAL
Brand: LIGACLIP

## 2022-11-16 DEVICE — ABSORBABLE HEMOSTAT (OXIDIZED REGENERATED CELLULOSE, U.S.P.)
Type: IMPLANTABLE DEVICE | Site: BREAST | Status: FUNCTIONAL
Brand: SURGICEL

## 2022-11-16 RX ORDER — NEOSTIGMINE METHYLSULFATE 5 MG/5 ML
SYRINGE (ML) INTRAVENOUS AS NEEDED
Status: DISCONTINUED | OUTPATIENT
Start: 2022-11-16 | End: 2022-11-16 | Stop reason: SURG

## 2022-11-16 RX ORDER — LIDOCAINE HYDROCHLORIDE 10 MG/ML
0.5 INJECTION, SOLUTION EPIDURAL; INFILTRATION; INTRACAUDAL; PERINEURAL ONCE AS NEEDED
Status: DISCONTINUED | OUTPATIENT
Start: 2022-11-16 | End: 2022-11-16 | Stop reason: HOSPADM

## 2022-11-16 RX ORDER — ONDANSETRON 2 MG/ML
4 INJECTION INTRAMUSCULAR; INTRAVENOUS EVERY 6 HOURS PRN
Status: DISCONTINUED | OUTPATIENT
Start: 2022-11-16 | End: 2022-11-18 | Stop reason: HOSPADM

## 2022-11-16 RX ORDER — DEXAMETHASONE SODIUM PHOSPHATE 4 MG/ML
INJECTION, SOLUTION INTRA-ARTICULAR; INTRALESIONAL; INTRAMUSCULAR; INTRAVENOUS; SOFT TISSUE AS NEEDED
Status: DISCONTINUED | OUTPATIENT
Start: 2022-11-16 | End: 2022-11-16 | Stop reason: SURG

## 2022-11-16 RX ORDER — PROPOFOL 10 MG/ML
VIAL (ML) INTRAVENOUS AS NEEDED
Status: DISCONTINUED | OUTPATIENT
Start: 2022-11-16 | End: 2022-11-16 | Stop reason: SURG

## 2022-11-16 RX ORDER — FENTANYL CITRATE 50 UG/ML
INJECTION, SOLUTION INTRAMUSCULAR; INTRAVENOUS AS NEEDED
Status: DISCONTINUED | OUTPATIENT
Start: 2022-11-16 | End: 2022-11-16 | Stop reason: SURG

## 2022-11-16 RX ORDER — OXYCODONE HYDROCHLORIDE 5 MG/1
5 TABLET ORAL EVERY 6 HOURS PRN
Status: DISCONTINUED | OUTPATIENT
Start: 2022-11-16 | End: 2022-11-18 | Stop reason: HOSPADM

## 2022-11-16 RX ORDER — MIDAZOLAM HYDROCHLORIDE 1 MG/ML
0.5 INJECTION INTRAMUSCULAR; INTRAVENOUS
Status: COMPLETED | OUTPATIENT
Start: 2022-11-16 | End: 2022-11-16

## 2022-11-16 RX ORDER — LABETALOL HYDROCHLORIDE 5 MG/ML
5 INJECTION, SOLUTION INTRAVENOUS
Status: DISCONTINUED | OUTPATIENT
Start: 2022-11-16 | End: 2022-11-16 | Stop reason: HOSPADM

## 2022-11-16 RX ORDER — ONDANSETRON 2 MG/ML
INJECTION INTRAMUSCULAR; INTRAVENOUS AS NEEDED
Status: DISCONTINUED | OUTPATIENT
Start: 2022-11-16 | End: 2022-11-16 | Stop reason: SURG

## 2022-11-16 RX ORDER — FAMOTIDINE 20 MG/1
20 TABLET, FILM COATED ORAL 2 TIMES DAILY
Status: DISCONTINUED | OUTPATIENT
Start: 2022-11-16 | End: 2022-11-18 | Stop reason: HOSPADM

## 2022-11-16 RX ORDER — SODIUM CHLORIDE 0.9 % (FLUSH) 0.9 %
3 SYRINGE (ML) INJECTION AS NEEDED
Status: DISCONTINUED | OUTPATIENT
Start: 2022-11-16 | End: 2022-11-16 | Stop reason: HOSPADM

## 2022-11-16 RX ORDER — MAGNESIUM HYDROXIDE 1200 MG/15ML
LIQUID ORAL AS NEEDED
Status: DISCONTINUED | OUTPATIENT
Start: 2022-11-16 | End: 2022-11-16 | Stop reason: HOSPADM

## 2022-11-16 RX ORDER — OXYCODONE HYDROCHLORIDE AND ACETAMINOPHEN 5; 325 MG/1; MG/1
1 TABLET ORAL ONCE AS NEEDED
Status: COMPLETED | OUTPATIENT
Start: 2022-11-16 | End: 2022-11-16

## 2022-11-16 RX ORDER — IBUPROFEN 600 MG/1
600 TABLET ORAL ONCE AS NEEDED
Status: COMPLETED | OUTPATIENT
Start: 2022-11-16 | End: 2022-11-16

## 2022-11-16 RX ORDER — HEPARIN SODIUM 5000 [USP'U]/ML
5000 INJECTION, SOLUTION INTRAVENOUS; SUBCUTANEOUS ONCE
Status: COMPLETED | OUTPATIENT
Start: 2022-11-16 | End: 2022-11-16

## 2022-11-16 RX ORDER — HYDROMORPHONE HYDROCHLORIDE 1 MG/ML
0.5 INJECTION, SOLUTION INTRAMUSCULAR; INTRAVENOUS; SUBCUTANEOUS
Status: DISCONTINUED | OUTPATIENT
Start: 2022-11-16 | End: 2022-11-18 | Stop reason: HOSPADM

## 2022-11-16 RX ORDER — FLUMAZENIL 0.1 MG/ML
0.2 INJECTION INTRAVENOUS AS NEEDED
Status: DISCONTINUED | OUTPATIENT
Start: 2022-11-16 | End: 2022-11-16 | Stop reason: HOSPADM

## 2022-11-16 RX ORDER — HEPARIN SODIUM 5000 [USP'U]/ML
5000 INJECTION, SOLUTION INTRAVENOUS; SUBCUTANEOUS EVERY 8 HOURS SCHEDULED
Status: DISCONTINUED | OUTPATIENT
Start: 2022-11-17 | End: 2022-11-18 | Stop reason: HOSPADM

## 2022-11-16 RX ORDER — OXYCODONE AND ACETAMINOPHEN 7.5; 325 MG/1; MG/1
1 TABLET ORAL EVERY 4 HOURS PRN
Status: DISCONTINUED | OUTPATIENT
Start: 2022-11-16 | End: 2022-11-16 | Stop reason: HOSPADM

## 2022-11-16 RX ORDER — LIDOCAINE HYDROCHLORIDE 20 MG/ML
INJECTION, SOLUTION EPIDURAL; INFILTRATION; INTRACAUDAL; PERINEURAL AS NEEDED
Status: DISCONTINUED | OUTPATIENT
Start: 2022-11-16 | End: 2022-11-16 | Stop reason: SURG

## 2022-11-16 RX ORDER — LABETALOL HYDROCHLORIDE 5 MG/ML
10 INJECTION, SOLUTION INTRAVENOUS EVERY 6 HOURS PRN
Status: DISCONTINUED | OUTPATIENT
Start: 2022-11-16 | End: 2022-11-18 | Stop reason: HOSPADM

## 2022-11-16 RX ORDER — DEXAMETHASONE SODIUM PHOSPHATE 4 MG/ML
4 INJECTION, SOLUTION INTRA-ARTICULAR; INTRALESIONAL; INTRAMUSCULAR; INTRAVENOUS; SOFT TISSUE ONCE AS NEEDED
Status: COMPLETED | OUTPATIENT
Start: 2022-11-16 | End: 2022-11-16

## 2022-11-16 RX ORDER — CLINDAMYCIN PHOSPHATE 600 MG/50ML
600 INJECTION INTRAVENOUS
Status: COMPLETED | OUTPATIENT
Start: 2022-11-16 | End: 2022-11-16

## 2022-11-16 RX ORDER — SODIUM CHLORIDE 0.9 % (FLUSH) 0.9 %
3 SYRINGE (ML) INJECTION EVERY 12 HOURS SCHEDULED
Status: DISCONTINUED | OUTPATIENT
Start: 2022-11-16 | End: 2022-11-16 | Stop reason: HOSPADM

## 2022-11-16 RX ORDER — SODIUM CHLORIDE, SODIUM LACTATE, POTASSIUM CHLORIDE, CALCIUM CHLORIDE 600; 310; 30; 20 MG/100ML; MG/100ML; MG/100ML; MG/100ML
75 INJECTION, SOLUTION INTRAVENOUS CONTINUOUS
Status: DISCONTINUED | OUTPATIENT
Start: 2022-11-16 | End: 2022-11-18 | Stop reason: HOSPADM

## 2022-11-16 RX ORDER — CLINDAMYCIN PHOSPHATE 600 MG/50ML
600 INJECTION INTRAVENOUS EVERY 8 HOURS
Status: COMPLETED | OUTPATIENT
Start: 2022-11-16 | End: 2022-11-17

## 2022-11-16 RX ORDER — NALOXONE HCL 0.4 MG/ML
0.4 VIAL (ML) INJECTION AS NEEDED
Status: DISCONTINUED | OUTPATIENT
Start: 2022-11-16 | End: 2022-11-16 | Stop reason: HOSPADM

## 2022-11-16 RX ORDER — FENTANYL CITRATE 50 UG/ML
25 INJECTION, SOLUTION INTRAMUSCULAR; INTRAVENOUS
Status: DISCONTINUED | OUTPATIENT
Start: 2022-11-16 | End: 2022-11-16 | Stop reason: HOSPADM

## 2022-11-16 RX ORDER — SODIUM CHLORIDE, SODIUM LACTATE, POTASSIUM CHLORIDE, CALCIUM CHLORIDE 600; 310; 30; 20 MG/100ML; MG/100ML; MG/100ML; MG/100ML
1000 INJECTION, SOLUTION INTRAVENOUS CONTINUOUS
Status: DISCONTINUED | OUTPATIENT
Start: 2022-11-16 | End: 2022-11-16

## 2022-11-16 RX ORDER — ROCURONIUM BROMIDE 10 MG/ML
INJECTION, SOLUTION INTRAVENOUS AS NEEDED
Status: DISCONTINUED | OUTPATIENT
Start: 2022-11-16 | End: 2022-11-16 | Stop reason: SURG

## 2022-11-16 RX ORDER — DOCUSATE SODIUM 100 MG/1
100 CAPSULE, LIQUID FILLED ORAL 2 TIMES DAILY
Status: DISCONTINUED | OUTPATIENT
Start: 2022-11-16 | End: 2022-11-18 | Stop reason: HOSPADM

## 2022-11-16 RX ORDER — ACETAMINOPHEN 500 MG
1000 TABLET ORAL EVERY 8 HOURS
Status: DISCONTINUED | OUTPATIENT
Start: 2022-11-16 | End: 2022-11-18 | Stop reason: HOSPADM

## 2022-11-16 RX ORDER — ACETAMINOPHEN 500 MG
1000 TABLET ORAL ONCE
Status: DISCONTINUED | OUTPATIENT
Start: 2022-11-16 | End: 2022-11-16 | Stop reason: HOSPADM

## 2022-11-16 RX ORDER — ONDANSETRON 4 MG/1
4 TABLET, FILM COATED ORAL EVERY 6 HOURS PRN
Status: DISCONTINUED | OUTPATIENT
Start: 2022-11-16 | End: 2022-11-18 | Stop reason: HOSPADM

## 2022-11-16 RX ORDER — SODIUM CHLORIDE, SODIUM LACTATE, POTASSIUM CHLORIDE, CALCIUM CHLORIDE 600; 310; 30; 20 MG/100ML; MG/100ML; MG/100ML; MG/100ML
100 INJECTION, SOLUTION INTRAVENOUS CONTINUOUS
Status: DISCONTINUED | OUTPATIENT
Start: 2022-11-16 | End: 2022-11-16

## 2022-11-16 RX ORDER — EPINEPHRINE 1 MG/ML
INJECTION, SOLUTION, CONCENTRATE INTRAVENOUS AS NEEDED
Status: DISCONTINUED | OUTPATIENT
Start: 2022-11-16 | End: 2022-11-16 | Stop reason: HOSPADM

## 2022-11-16 RX ORDER — NALOXONE HCL 0.4 MG/ML
0.1 VIAL (ML) INJECTION
Status: DISCONTINUED | OUTPATIENT
Start: 2022-11-16 | End: 2022-11-18 | Stop reason: HOSPADM

## 2022-11-16 RX ORDER — SODIUM CHLORIDE 0.9 % (FLUSH) 0.9 %
3-10 SYRINGE (ML) INJECTION AS NEEDED
Status: DISCONTINUED | OUTPATIENT
Start: 2022-11-16 | End: 2022-11-16 | Stop reason: HOSPADM

## 2022-11-16 RX ADMIN — DEXAMETHASONE SODIUM PHOSPHATE 4 MG: 4 INJECTION INTRA-ARTICULAR; INTRALESIONAL; INTRAMUSCULAR; INTRAVENOUS; SOFT TISSUE at 13:34

## 2022-11-16 RX ADMIN — CLINDAMYCIN IN 5 PERCENT DEXTROSE 600 MG: 12 INJECTION, SOLUTION INTRAVENOUS at 14:30

## 2022-11-16 RX ADMIN — PROPOFOL 30 MG: 10 INJECTION, EMULSION INTRAVENOUS at 15:11

## 2022-11-16 RX ADMIN — OXYCODONE HYDROCHLORIDE 5 MG: 5 TABLET ORAL at 21:11

## 2022-11-16 RX ADMIN — HYDROMORPHONE HYDROCHLORIDE 0.5 MG: 1 INJECTION, SOLUTION INTRAMUSCULAR; INTRAVENOUS; SUBCUTANEOUS at 15:11

## 2022-11-16 RX ADMIN — SODIUM CHLORIDE, POTASSIUM CHLORIDE, SODIUM LACTATE AND CALCIUM CHLORIDE 75 ML/HR: 600; 310; 30; 20 INJECTION, SOLUTION INTRAVENOUS at 17:31

## 2022-11-16 RX ADMIN — GLYCOPYRROLATE 0.4 MG: 0.2 INJECTION INTRAMUSCULAR; INTRAVENOUS at 15:09

## 2022-11-16 RX ADMIN — LIDOCAINE HYDROCHLORIDE 100 MG: 20 INJECTION, SOLUTION EPIDURAL; INFILTRATION; INTRACAUDAL; PERINEURAL at 15:11

## 2022-11-16 RX ADMIN — DOCUSATE SODIUM 100 MG: 100 CAPSULE, LIQUID FILLED ORAL at 21:04

## 2022-11-16 RX ADMIN — FENTANYL CITRATE 25 MCG: 50 INJECTION INTRAMUSCULAR; INTRAVENOUS at 15:58

## 2022-11-16 RX ADMIN — FENTANYL CITRATE 50 MCG: 50 INJECTION INTRAMUSCULAR; INTRAVENOUS at 14:24

## 2022-11-16 RX ADMIN — MIDAZOLAM HYDROCHLORIDE 0.5 MG: 2 INJECTION, SOLUTION INTRAMUSCULAR; INTRAVENOUS at 13:35

## 2022-11-16 RX ADMIN — FAMOTIDINE 20 MG: 20 TABLET, FILM COATED ORAL at 21:04

## 2022-11-16 RX ADMIN — HEPARIN SODIUM 5000 UNITS: 5000 INJECTION, SOLUTION INTRAVENOUS; SUBCUTANEOUS at 13:34

## 2022-11-16 RX ADMIN — FENTANYL CITRATE 25 MCG: 50 INJECTION INTRAMUSCULAR; INTRAVENOUS at 15:53

## 2022-11-16 RX ADMIN — ACETAMINOPHEN 1000 MG: 500 TABLET ORAL at 17:47

## 2022-11-16 RX ADMIN — IBUPROFEN 600 MG: 600 TABLET, FILM COATED ORAL at 16:27

## 2022-11-16 RX ADMIN — ROCURONIUM BROMIDE 30 MG: 10 SOLUTION INTRAVENOUS at 14:24

## 2022-11-16 RX ADMIN — FENTANYL CITRATE 50 MCG: 50 INJECTION INTRAMUSCULAR; INTRAVENOUS at 14:44

## 2022-11-16 RX ADMIN — MIDAZOLAM HYDROCHLORIDE 0.5 MG: 2 INJECTION, SOLUTION INTRAMUSCULAR; INTRAVENOUS at 13:48

## 2022-11-16 RX ADMIN — DEXAMETHASONE SODIUM PHOSPHATE 8 MG: 4 INJECTION, SOLUTION INTRA-ARTICULAR; INTRALESIONAL; INTRAMUSCULAR; INTRAVENOUS; SOFT TISSUE at 15:09

## 2022-11-16 RX ADMIN — FENTANYL CITRATE 25 MCG: 50 INJECTION INTRAMUSCULAR; INTRAVENOUS at 15:44

## 2022-11-16 RX ADMIN — CLINDAMYCIN PHOSPHATE 600 MG: 600 INJECTION, SOLUTION INTRAVENOUS at 22:21

## 2022-11-16 RX ADMIN — PROPOFOL 120 MG: 10 INJECTION, EMULSION INTRAVENOUS at 14:24

## 2022-11-16 RX ADMIN — OXYCODONE HYDROCHLORIDE AND ACETAMINOPHEN 1 TABLET: 5; 325 TABLET ORAL at 15:42

## 2022-11-16 RX ADMIN — FENTANYL CITRATE 25 MCG: 50 INJECTION INTRAMUSCULAR; INTRAVENOUS at 15:39

## 2022-11-16 RX ADMIN — ONDANSETRON 4 MG: 2 INJECTION INTRAMUSCULAR; INTRAVENOUS at 15:09

## 2022-11-16 RX ADMIN — HYDROMORPHONE HYDROCHLORIDE 0.5 MG: 1 INJECTION, SOLUTION INTRAMUSCULAR; INTRAVENOUS; SUBCUTANEOUS at 17:48

## 2022-11-16 RX ADMIN — Medication 3 MG: at 15:09

## 2022-11-16 RX ADMIN — LIDOCAINE HYDROCHLORIDE 100 MG: 20 INJECTION, SOLUTION EPIDURAL; INFILTRATION; INTRACAUDAL; PERINEURAL at 14:24

## 2022-11-16 RX ADMIN — SODIUM CHLORIDE, POTASSIUM CHLORIDE, SODIUM LACTATE AND CALCIUM CHLORIDE 1000 ML: 600; 310; 30; 20 INJECTION, SOLUTION INTRAVENOUS at 12:58

## 2022-11-16 NOTE — OP NOTE
"Right Modified Radical Mastectomy with Wound Vac Placement Operative Report:     Patient: Tiffanie Pelaoy  MRN: [id number]    YOB: 1945  Age: 77 y.o.  Sex: female  Unit:  PAD OR Room/Bed: PAD OR/MAIN OR Location: [facility]      Admitting Physician: WIN ARTEAGA    Primary Care Physician: Nolberto Pelayo, ANISA             INDICATIONS: Tiffanie Pelayo is a 77 y.o. female with a large right exophytic breast mass that is adherent to the chest wall. This is highly concerning for long standing breast cancer. She explicitly states she does not trust modern medical therapies or physicians. She needs a metastatic work up however she does not want a \"lot of scans.\" she had a PET scan which did show metastatic disease in bone, mediastinal lymph node, and the contralateral axilla. Ideally we would just proceed with salvage mastectomy, however her enlarged right axillary nodes are growing through the skin and bleeding as well. We will plan to proceed with modified radical mastectomy with wound vac placement. There is not enough skin that is uninvolved by the cancer to close the wound.  She says she will not take chemotherapy or radiation therapy but she will consider anti-endocrine therapy. Ideally we would have a pre-op biopsy, however she only wants one procedure. This is a very complicated case. To OR today.     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, unspecified estrogen receptor status (HCC) [C50.611]    POSTOPERATIVE DIAGNOSIS: Same    PROCEDURES PERFORMED:    (1) Right Modified Radical Mastectomy  (2) Placement of Wound Vac     Axillary Lymph Node Dissection for Breast Cancer - Right  Operation performed with curative intent No   Resection was performed within the boundaries of the " axillary vein, chest wall (serratus anterior), and latissimus dorsi No Only the grossly palpable nodes that were growing through the skin and bleeding were removed as this was a salvage operation for palliation of bleeding   Nerves identified and preserved during dissection Long thoracic nerve and Thoracodorsal nerve   Level III nodes were removed No       PROCEDURE DETAILS:    After informed consent was obtained, the patient was brought to the operating room and the site of surgery was confirmed. General anesthesia without paralysis was induced.   The bilateral chest and right axilla were prepped with Betadine and draped in sterile fashion. A time out was completed verifying the correct patient, procedure, site, positioning and special equipment needed prior to incision.       An elliptical skin incision was made on the right that encompassed the entire exophytic mass and the exophytic lymph nodes in a generally transverse direction across the breast. Flaps were raised in the avascular plane between the subcutaneous tissue and the breast tissue from the clavicle superiorly, the sternum medially, the anterior rectus sheath inferiorly, and the anterior border of the latissimus dorsi muscle laterally. Hemostasis was achieved on the flaps. Next the breast tissue and underlying pectoralis fascia were excised form the pectoralis major, progressing from medially to laterally. At the lateral border of the pectoralis major muscle, the enlarged axillary nodes were noted to be growing into the tumor and through the skin. They were taken en bloc with the tumor. The lymphatic and vasculature bundles feeding the lymph nodes were clipped and divided. The specimen was removed with cautery. The specimen was marked with a short stitch superiorly and a long stitch laterally. It was sent for permanent pathology and for ER/AL/Her2 receptor status.        The surgical bed was washed out with sterile water and hemostasis was again  confirmed. Surgicel was placed in the wound bed. Two pieces of black foam were also placed in the wound. The clear adhesive dressing was placed and it was connected to the vac which showed a good seal. The patient tolerated the procedure well and was transferred to PACU in good condition.     Findings: 11 x 13 cm exophytic breast mass with exophytic bleeding lymph nodes in the right axilla    Estimated Blood Loss: 150mL  Complications: none apparent   Specimens: Right mastectomy and axillary lymph nodes, short stitch superior, long right lateral      Disposition: PACU - hemodynamically stable.           Condition: stable    Griselda Arteaga MD  11/04/2022

## 2022-11-16 NOTE — ANESTHESIA PREPROCEDURE EVALUATION
Anesthesia Evaluation     Patient summary reviewed and Nursing notes reviewed   no history of anesthetic complications:  NPO Solid Status: > 8 hours  NPO Liquid Status: > 8 hours           Airway   Mallampati: I  No difficulty expected  Dental      Pulmonary    (-) sleep apnea, not a smoker  Cardiovascular   Exercise tolerance: good (4-7 METS)    (-) hypertension, CAD      Neuro/Psych  (+) psychiatric history Anxiety,    (-) seizures, TIA, CVA  GI/Hepatic/Renal/Endo    (+) obesity,     (-) no renal disease, diabetes    Musculoskeletal     Abdominal    Substance History      OB/GYN          Other      history of cancer (breast cancer) active                    Anesthesia Plan    ASA 2     general     intravenous induction     Anesthetic plan, risks, benefits, and alternatives have been provided, discussed and informed consent has been obtained with: patient.        CODE STATUS:

## 2022-11-16 NOTE — ANESTHESIA PROCEDURE NOTES
Airway  Urgency: elective    Date/Time: 11/16/2022 2:25 PM  Airway not difficult    General Information and Staff    Patient location during procedure: OR  CRNA/CAA: Griselda Xiao CRNA    Indications and Patient Condition  Indications for airway management: airway protection    Preoxygenated: yes  Mask difficulty assessment: 1 - vent by mask    Final Airway Details  Final airway type: endotracheal airway      Successful airway: ETT  Cuffed: yes   Successful intubation technique: direct laryngoscopy  Facilitating devices/methods: Bougie and anterior pressure/BURP  Endotracheal tube insertion site: oral  Blade: Roseann  Blade size: 3.5  ETT size (mm): 7.5  Cormack-Lehane Classification: grade III - view of epiglottis only  Placement verified by: chest auscultation and capnometry   Cuff volume (mL): 6  Measured from: lips  ETT/EBT  to lips (cm): 22  Number of attempts at approach: 1  Assessment: atraumatic intubation    Additional Comments  Unable to visualize cords but bougie passed easily and tracheal rings noted on first pass.   Small cut noted to right upper lip after DL.

## 2022-11-16 NOTE — ANESTHESIA POSTPROCEDURE EVALUATION
"Patient: Tiffanie Pelayo    Procedure Summary     Date: 11/16/22 Room / Location:  PAD OR  /  PAD OR    Anesthesia Start: 1419 Anesthesia Stop: 1525    Procedure: RIGHT BREAST MASTECTOMY MODIFIED RADICAL WITH WOUND VAC PLACEMENT (Right: Breast) Diagnosis:       Mass of right breast, unspecified quadrant      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, unspecified estrogen receptor status (HCC)      (Mass of right breast, unspecified quadrant [N63.10])      (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, unspecified estrogen receptor status (HCC) [C50.611])    Surgeons: Griselda Arteaga MD Provider: Griselda Xiao CRNA    Anesthesia Type: general ASA Status: 2          Anesthesia Type: general    Vitals  Vitals Value Taken Time   /75 11/16/22 1655   Temp 97.9 °F (36.6 °C) 11/16/22 1655   Pulse 59 11/16/22 1655   Resp 16 11/16/22 1655   SpO2 99 % 11/16/22 1655           Post Anesthesia Care and Evaluation    Patient location during evaluation: PACU  Patient participation: complete - patient participated  Level of consciousness: awake and alert  Pain management: adequate    Airway patency: patent  Anesthetic complications: No anesthetic complications    Cardiovascular status: acceptable  Respiratory status: acceptable  Hydration status: acceptable    Comments: Blood pressure 161/75, pulse 59, temperature 97.9 °F (36.6 °C), temperature source Oral, resp. rate 16, height 152 cm (59.84\"), weight 72 kg (158 lb 11.7 oz), SpO2 99 %, not currently breastfeeding.    Pt discharged from PACU based on patricio score >8      "

## 2022-11-17 PROCEDURE — 25010000002 KETOROLAC TROMETHAMINE PER 15 MG: Performed by: SPECIALIST

## 2022-11-17 PROCEDURE — A9270 NON-COVERED ITEM OR SERVICE: HCPCS | Performed by: STUDENT IN AN ORGANIZED HEALTH CARE EDUCATION/TRAINING PROGRAM

## 2022-11-17 PROCEDURE — 63710000001 ACETAMINOPHEN 500 MG TABLET: Performed by: STUDENT IN AN ORGANIZED HEALTH CARE EDUCATION/TRAINING PROGRAM

## 2022-11-17 PROCEDURE — 63710000001 FAMOTIDINE 20 MG TABLET: Performed by: STUDENT IN AN ORGANIZED HEALTH CARE EDUCATION/TRAINING PROGRAM

## 2022-11-17 PROCEDURE — G0378 HOSPITAL OBSERVATION PER HR: HCPCS

## 2022-11-17 PROCEDURE — 99024 POSTOP FOLLOW-UP VISIT: CPT | Performed by: STUDENT IN AN ORGANIZED HEALTH CARE EDUCATION/TRAINING PROGRAM

## 2022-11-17 PROCEDURE — 63710000001 OXYCODONE 5 MG TABLET: Performed by: STUDENT IN AN ORGANIZED HEALTH CARE EDUCATION/TRAINING PROGRAM

## 2022-11-17 PROCEDURE — 63710000001 DOCUSATE SODIUM 100 MG CAPSULE: Performed by: STUDENT IN AN ORGANIZED HEALTH CARE EDUCATION/TRAINING PROGRAM

## 2022-11-17 RX ORDER — KETOROLAC TROMETHAMINE 30 MG/ML
30 INJECTION, SOLUTION INTRAMUSCULAR; INTRAVENOUS ONCE
Status: COMPLETED | OUTPATIENT
Start: 2022-11-17 | End: 2022-11-17

## 2022-11-17 RX ORDER — ONDANSETRON 4 MG/1
4 TABLET, FILM COATED ORAL EVERY 8 HOURS PRN
Qty: 15 TABLET | Refills: 0 | Status: SHIPPED | OUTPATIENT
Start: 2022-11-17 | End: 2023-01-23

## 2022-11-17 RX ORDER — IBUPROFEN 200 MG
600 TABLET ORAL EVERY 8 HOURS
Qty: 100 TABLET | Refills: 2
Start: 2022-11-17 | End: 2023-11-17

## 2022-11-17 RX ORDER — MORPHINE SULFATE 2 MG/ML
2 INJECTION, SOLUTION INTRAMUSCULAR; INTRAVENOUS EVERY 4 HOURS PRN
Status: DISCONTINUED | OUTPATIENT
Start: 2022-11-17 | End: 2022-11-18 | Stop reason: HOSPADM

## 2022-11-17 RX ORDER — OXYCODONE HYDROCHLORIDE 5 MG/1
5 TABLET ORAL EVERY 8 HOURS PRN
Qty: 15 TABLET | Refills: 0 | Status: SHIPPED | OUTPATIENT
Start: 2022-11-17 | End: 2023-01-23

## 2022-11-17 RX ORDER — ACETAMINOPHEN 325 MG/1
975 TABLET ORAL EVERY 8 HOURS
Qty: 100 TABLET | Refills: 2
Start: 2022-11-17 | End: 2023-11-17

## 2022-11-17 RX ADMIN — FAMOTIDINE 20 MG: 20 TABLET, FILM COATED ORAL at 10:03

## 2022-11-17 RX ADMIN — CLINDAMYCIN PHOSPHATE 600 MG: 600 INJECTION, SOLUTION INTRAVENOUS at 05:38

## 2022-11-17 RX ADMIN — KETOROLAC TROMETHAMINE 30 MG: 30 INJECTION, SOLUTION INTRAMUSCULAR; INTRAVENOUS at 18:00

## 2022-11-17 RX ADMIN — ACETAMINOPHEN 1000 MG: 500 TABLET ORAL at 18:00

## 2022-11-17 RX ADMIN — DOCUSATE SODIUM 100 MG: 100 CAPSULE, LIQUID FILLED ORAL at 10:03

## 2022-11-17 RX ADMIN — DOCUSATE SODIUM 100 MG: 100 CAPSULE, LIQUID FILLED ORAL at 21:24

## 2022-11-17 RX ADMIN — ACETAMINOPHEN 1000 MG: 500 TABLET ORAL at 10:03

## 2022-11-17 RX ADMIN — OXYCODONE HYDROCHLORIDE 5 MG: 5 TABLET ORAL at 05:36

## 2022-11-17 RX ADMIN — OXYCODONE HYDROCHLORIDE 5 MG: 5 TABLET ORAL at 12:00

## 2022-11-17 RX ADMIN — ACETAMINOPHEN 1000 MG: 500 TABLET ORAL at 02:03

## 2022-11-17 RX ADMIN — FAMOTIDINE 20 MG: 20 TABLET, FILM COATED ORAL at 21:24

## 2022-11-17 RX ADMIN — OXYCODONE HYDROCHLORIDE 5 MG: 5 TABLET ORAL at 18:01

## 2022-11-17 NOTE — PLAN OF CARE
Goal Outcome Evaluation:           Progress: improving  Outcome Evaluation: Tolerating pain with po and iv pain medication. IV did better. Hypertension. Not needed medication at present. SCD's on. Tolerating diet well. To wean off oxygen. Wound vac in place with no drainage so far.

## 2022-11-17 NOTE — CASE MANAGEMENT/SOCIAL WORK
Continued Stay Note  [facility]     Patient Name: Tiffanie Pelayo  MRN: [id number]  Today's Date: 11/17/2022    Admit Date: 11/16/2022    Plan: .   Discharge Plan     Row Name 11/17/22 1057       Plan    Plan .    Plan Comments Woundvac form for physician signature left at 2A nurses station.  Info submitted to [employer] to start approval process.               Discharge Codes    No documentation.                     ROSEY Brito

## 2022-11-17 NOTE — PLAN OF CARE
Goal Outcome Evaluation:  Plan of Care Reviewed With: patient           Outcome Evaluation: VSS. Pt is voiding and ambulating without difficulty. Pt is taking PO pain meds to control pain. SCD's in place. Wound vac is in place.

## 2022-11-17 NOTE — NURSING NOTE
Spoke to Dr. Mouna Wade in regards to patient having a discharge order and has not yet been approved for her home wound vac. Dr. Wade stated to cancel pt's discharge order at this time.

## 2022-11-17 NOTE — DISCHARGE SUMMARY
Consults     No orders found for last 30 day(s).       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 female presented with a large exophytic breast mass that was bleeding. She underwent modified radical mastectomy on 11/16/22. Post operatively she tolerated a diet, was ambulating well, and pain was controlled on oral meds. Her wound vac was approved for home. She was discharged home in good condition on 11/18/22.       Procedures Performed  Procedure(s):  RIGHT BREAST MASTECTOMY MODIFIED RADICAL WITH WOUND VAC PLACEMENT       Consults:   Consults     No orders found for last 30 day(s).          Condition on Discharge:  Improved     Vital Signs  Temp:  [97.4 °F (36.3 °C)-98.5 °F (36.9 °C)] 98.2 °F (36.8 °C)  Heart Rate:  [] 71  Resp:  [10-17] 16  BP: (112-176)/(54-82) 112/57    Physical Exam:   See History and Physical found in chart.    Discharge Disposition  Home or Self Care    Discharge Medications     Discharge Medications      New Medications      Instructions Start Date   ibuprofen 200 MG tablet  Commonly known as: Motrin IB   600 mg, Oral, Every 8 Hours, Take every 8 hours for three days then take as needed.      ondansetron 4 MG tablet  Commonly known as: Zofran   4 mg, Oral, Every 8 Hours PRN      ondansetron 4 MG tablet  Commonly known as: Zofran   4 mg, Oral, Every 8 Hours PRN      oxyCODONE 5 MG immediate release tablet  Commonly known as: Roxicodone   5 mg, Oral, Every 8 Hours PRN         Changes to Medications      Instructions Start Date   acetaminophen 160 MG/5ML solution  Commonly known as: TYLENOL  What changed: Another medication with the same name was added. Make  sure you understand how and when to take each.   15 mg/kg, Oral, Every 4 Hours PRN      acetaminophen 325 MG tablet  Commonly known as: Tylenol  What changed: You were already taking a medication with the same name, and this prescription was added. Make sure you understand how and when to take each.   975 mg, Oral, Every 8 Hours, Take every 8 hours for 3 days then take prn as needed.         Continue These Medications      Instructions Start Date   Beta Glucan powder   3 capsules, Oral, Daily      NON FORMULARY   Oral, Daily, 1 scoop of magnesium dietary supplement powder mixed in liquid.      PROBIOTIC DAILY PO   Oral, Daily      vitamin b complex capsule capsule   1 capsule, Oral, Daily      VITAMIN C PO   8 tablets, Oral, Daily      vitamin D3 125 MCG (5000 UT) capsule capsule   5,000 Units, Oral, Daily      vitamin E 200 UNIT capsule   200 Units, Oral, Daily             Discharge Diet: regular     Activity at Discharge: as tolerated     Follow-up Appointments  No future appointments.      Test Results Pending at Discharge  Pending Labs     Order Current Status    Tissue Pathology Exam In process           Griselda rAteaga MD  11/17/22  12:46 CST

## 2022-11-17 NOTE — PLAN OF CARE
Goal Outcome Evaluation:  Plan of Care Reviewed With: patient        Progress: improving  Outcome Evaluation: vitals stable, voiding without diff, taking po without nausea. has been to bathroom x 2 with assist of 2 using incentive spirometer, scd's on, taking pain medication to stay comfortable, pain level on pain scale from 1 to 10 has been 0 to 1-2. has been on o2 at 2 liters during the night but on room air now at 95% .

## 2022-11-17 NOTE — CASE MANAGEMENT/SOCIAL WORK
Continued Stay Note  [facility]     Patient Name: Tiffanie Pelayo  MRN: [id number]  Today's Date: 11/17/2022    Admit Date: 11/16/2022    Plan: Home with woundvac and outpatient wound care.   Discharge Plan     Row Name 11/17/22 1134       Plan    Plan Home with woundvac and outpatient wound care.    Patient/Family in Agreement with Plan yes    Plan Comments [employer] order form signed by physician and faxed with medical records.  Awaiting of approval of home woundvac.    Row Name 11/17/22 7217       Plan    Plan .    Plan Comments Woundvac form for physician signature left at 2A nurses station.  Info submitted to [employer] to start approval process.               Discharge Codes    No documentation.                     ROSEY Brito

## 2022-11-18 VITALS
BODY MASS INDEX: 31.16 KG/M2 | WEIGHT: 158.73 LBS | SYSTOLIC BLOOD PRESSURE: 156 MMHG | HEART RATE: 99 BPM | RESPIRATION RATE: 16 BRPM | TEMPERATURE: 99.7 F | DIASTOLIC BLOOD PRESSURE: 85 MMHG | HEIGHT: 60 IN | OXYGEN SATURATION: 93 %

## 2022-11-18 PROCEDURE — 63710000001 OXYCODONE 5 MG TABLET: Performed by: STUDENT IN AN ORGANIZED HEALTH CARE EDUCATION/TRAINING PROGRAM

## 2022-11-18 PROCEDURE — G0378 HOSPITAL OBSERVATION PER HR: HCPCS

## 2022-11-18 PROCEDURE — 97606 NEG PRS WND THER DME>50 SQCM: CPT | Performed by: PHYSICAL THERAPIST

## 2022-11-18 PROCEDURE — 63710000001 DOCUSATE SODIUM 100 MG CAPSULE: Performed by: STUDENT IN AN ORGANIZED HEALTH CARE EDUCATION/TRAINING PROGRAM

## 2022-11-18 PROCEDURE — 97161 PT EVAL LOW COMPLEX 20 MIN: CPT | Performed by: PHYSICAL THERAPIST

## 2022-11-18 PROCEDURE — A9270 NON-COVERED ITEM OR SERVICE: HCPCS | Performed by: STUDENT IN AN ORGANIZED HEALTH CARE EDUCATION/TRAINING PROGRAM

## 2022-11-18 PROCEDURE — 63710000001 ACETAMINOPHEN 500 MG TABLET: Performed by: STUDENT IN AN ORGANIZED HEALTH CARE EDUCATION/TRAINING PROGRAM

## 2022-11-18 PROCEDURE — 63710000001 FAMOTIDINE 20 MG TABLET: Performed by: STUDENT IN AN ORGANIZED HEALTH CARE EDUCATION/TRAINING PROGRAM

## 2022-11-18 RX ADMIN — OXYCODONE HYDROCHLORIDE 5 MG: 5 TABLET ORAL at 08:13

## 2022-11-18 RX ADMIN — ACETAMINOPHEN 1000 MG: 500 TABLET ORAL at 09:48

## 2022-11-18 RX ADMIN — DOCUSATE SODIUM 100 MG: 100 CAPSULE, LIQUID FILLED ORAL at 08:13

## 2022-11-18 RX ADMIN — ACETAMINOPHEN 1000 MG: 500 TABLET ORAL at 02:07

## 2022-11-18 RX ADMIN — OXYCODONE HYDROCHLORIDE 5 MG: 5 TABLET ORAL at 11:31

## 2022-11-18 RX ADMIN — FAMOTIDINE 20 MG: 20 TABLET, FILM COATED ORAL at 09:48

## 2022-11-18 NOTE — CASE MANAGEMENT/SOCIAL WORK
Continued Stay Note   [facility]     Patient Name: Tiffanie Pelayo  MRN: [id number]  Today's Date: 11/18/2022    Admit Date: 11/16/2022    Plan: Home - Outpatient Wound Care appointments   Discharge Plan     Row Name 11/18/22 0943       Plan    Plan Home - Outpatient Wound Care appointments    Patient/Family in Agreement with Plan yes    Plan Comments Patient's woundvac was approved and delivered to her room.  Proof of delivery form was not signed by patient due to patient's secondary insurance not being filed by [employer] due to secondary insurance being listed differently on face sheet.  SW contacted [employer] customer service who will be refiling patient's vac cost with Medicare and her secondary now.  [employer] will mail proof of delivery and assignment of benefits statement directly to patient's home for her to mail back once received.  Patient's wound care appointment has been scheduled for Monday, November 21st at 8:45 am.    Final Discharge Disposition Code 01 - home or self-care               Discharge Codes    No documentation.               Expected Discharge Date and Time     Expected Discharge Date Expected Discharge Time    Nov 18, 2022             ROSEY Brito

## 2022-11-18 NOTE — THERAPY WOUND CARE TREATMENT
Acute Care - Wound/Debridement Initial Evaluation  [facility]     Patient Name: Tiffanie Pelayo  : 1945  MRN: [id number]  Today's Date: 2022  Onset of Illness/Injury or Date of Surgery: 22      Referring Physician: Dr. Arteaga      Admit Date: 2022    Visit Dx:    ICD-10-CM ICD-9-CM   1. 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, unspecified estrogen receptor status (HCC)  C50.611 174.6   2. Mass of right breast, unspecified quadrant  N63.10 611.72   3. Open wound  T14.8XXA 879.8       Patient Active Problem List   Diagnosis   • Mass of right breast   • 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 (HCC)   • 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, unspecified estrogen receptor status (HCC)        Past Medical History:   Diagnosis Date   • Anxiety         Past Surgical History:   Procedure Laterality Date   •  SECTION     • GALLBLADDER SURGERY     • MASTECTOMY Right 2022    Procedure: RIGHT BREAST MASTECTOMY MODIFIED RADICAL WITH WOUND VAC PLACEMENT;  Surgeon: Griselda Arteaga MD;  Location: [facility];  Service: General;  Laterality: Right;   • TONSILLECTOMY             Wound 22 1440 Right breast Incision (Active)   Wound Image   22 09   Dressing Appearance no drainage;dry;intact 22 08   Closure DILLON 22 08   Base bleeding;moist;pink;red;subcutaneous;yellow;exposed structure 22 09   Black (%), Wound Tissue Color 10 22 0955   Red (%), Wound Tissue Color 60 22 0955   Yellow (%), Wound Tissue Color 30 22 0955   Periwound ecchymotic;redness 22 09   Periwound Temperature warm 22 09   Periwound Skin Turgor firm 22 09   Edges open 22 0955   Wound Length (cm) 8 cm 22 0955   Wound Width (cm) 19 cm 22 0955   Wound Depth (cm) 6 cm 22 0955   Wound Surface Area (cm^2) 152 cm^2 22 0955   Wound Volume (cm^3) 912 cm^3 22 0982    Drainage Characteristics/Odor sanguineous 11/18/22 0955   Drainage Amount moderate 11/18/22 0955   Care, Wound cleansed with;sterile normal saline;negative pressure wound therapy 11/18/22 0955   Dressing Care dressing changed 11/18/22 0955   Periwound Care barrier film applied;barrier ointment applied 11/18/22 0955   Wound Output (mL) 200 11/18/22 0955       NPWT (Negative Pressure Wound Therapy) 11/16/22 1509 RIGHT breast (Active)   Therapy Setting continuous therapy 11/18/22 0955   Dressing foam, black;foam, white 11/18/22 0955   Pressure Setting 125 mmHg 11/18/22 0955   Sponges Inserted 3 11/18/22 0955   Sponges Removed 2 11/18/22 0955   Finger sweep complete Yes 11/18/22 0955         WOUND DEBRIDEMENT                     PT Assessment (last 12 hours)     PT Evaluation and Treatment     Row Name 11/18/22 0955          Physical Therapy Time and Intention    Subjective Information complains of;pain;numbness  RUE numbness  -SB     Document Type evaluation;wound care  -SB     Mode of Treatment physical therapy  -SB     Row Name 11/18/22 0955          General Information    Patient Profile Reviewed yes  -SB     Onset of Illness/Injury or Date of Surgery 11/16/22  -SB     Referring Physician Dr. Arteaga  -     Patient Observations alert;cooperative;agree to therapy  -SB     Pertinent History of Current Functional Problem Right Modified Radical Mastectomy with Wound Vac Placement  -SB     Row Name 11/18/22 0955          Pain    Pretreatment Pain Rating 2/10  -SB     Posttreatment Pain Rating 10/10  -SB     Pain Location - Side/Orientation Right  -SB     Pain Location - chest  -SB     Row Name 11/18/22 0955          Cognition    Orientation Status (Cognition) oriented x 4  -SB     Row Name 11/18/22 0955          Wound 11/16/22 1440 Right breast Incision    Wound - Properties Group Placement Date: 11/16/22  -SL Placement Time: 1440  -SL Side: Right  -SL Location: breast  -SL Primary Wound Type: Incision  -SL    Wound  Image Images linked: 1  -SB     Base bleeding;moist;pink;red;subcutaneous;yellow;exposed structure  -SB     Black (%), Wound Tissue Color 10  -SB     Red (%), Wound Tissue Color 60  -SB     Yellow (%), Wound Tissue Color 30  -SB     Periwound ecchymotic;redness  -SB     Periwound Temperature warm  -SB     Periwound Skin Turgor firm  -SB     Edges open  -SB     Wound Length (cm) 8 cm  -SB     Wound Width (cm) 19 cm  -SB     Wound Depth (cm) 6 cm  -SB     Wound Surface Area (cm^2) 152 cm^2  -SB     Wound Volume (cm^3) 912 cm^3  -SB     Drainage Characteristics/Odor sanguineous  -SB     Drainage Amount moderate  -SB     Care, Wound cleansed with;sterile normal saline;negative pressure wound therapy  -SB     Dressing Care dressing changed  -SB     Periwound Care barrier film applied;barrier ointment applied  -SB     Wound Output (mL) 200  -SB     Retired Wound - Properties Group Placement Date: 11/16/22  -SL Placement Time: 1440  -SL Side: Right  -SL Location: breast  -SL Primary Wound Type: Incision  -SL    Retired Wound - Properties Group Date first assessed: 11/16/22  -SL Time first assessed: 1440  -SL Side: Right  -SL Location: breast  -SL Primary Wound Type: Incision  -SL    Row Name 11/18/22 0955          NPWT (Negative Pressure Wound Therapy) 11/16/22 1509 RIGHT breast    NPWT (Negative Pressure Wound Therapy) - Properties Group Placement Date: 11/16/22 -SL Placement Time: 1509  -SL Location: RIGHT breast  -SL Additional Comments: 2 pcs of black sponge  -SL    Therapy Setting continuous therapy  -SB     Dressing foam, black;foam, white  -SB     Pressure Setting 125 mmHg  -SB     Sponges Inserted 3  2 black and 1 white  -SB     Sponges Removed 2  -SB     Finger sweep complete Yes  -SB     Retired NPWT (Negative Pressure Wound Therapy) - Properties Group Placement Date: 11/16/22  -SL Placement Time: 1509  -SL Location: RIGHT breast  -SL Additional Comments: 2 pcs of black sponge  -SL    Retired NPWT (Negative  Pressure Wound Therapy) - Properties Group Placement Date: 11/16/22  -SL Placement Time: 1509  -SL Location: RIGHT breast  -SL Additional Comments: 2 pcs of black sponge  -SL    Row Name 11/18/22 0955          Plan of Care Review    Plan of Care Reviewed With patient  -SB     Progress no change  -SB     Outcome Evaluation PT wound eval completed. Pt alert and oriented x4 with c/o 2/10 pain in incisional wound. Pt also reports mild numbness in R hand as well. Wound dressing removed, and wound bed displays controlled bleeding with adipose tissue, exposed muscle and residual silvercel. Wound measures 8 cm x 19 cm x 6 cm with red periowound. 1 piece of white foam and 2 pieces of black foam used to pack wound. Seal achieved. Wound dressing attached to home vac in room and pt educated on home vac use. Hospital vac placed in 3C dirty utility closet. Pt to d/c home today with home vac and OP wound care beginning 11/21.  -SB     Row Name 11/18/22 0955          Positioning and Restraints    Pre-Treatment Position bathroom  -SB     Post Treatment Position bed  -SB     In Bed fowlers;call light within reach;encouraged to call for assist;notified nsg;with family/caregiver  -SB     Row Name 11/18/22 0955          Therapy Assessment/Plan (PT)    Patient/Family Therapy Goals Statement (PT) wound healing  -SB     Criteria for Skilled Interventions Met (PT) no;other (see comments)  same day d/c  -SB     Therapy Frequency (PT) evaluation only  -SB     Row Name 11/18/22 0955          Physical Therapy Goals    Wound Care Goal Selection (PT) --  -SB           User Key  (r) = Recorded By, (t) = Taken By, (c) = Cosigned By    Initials Name Provider Type    Susan Gaytan, PT DPT Physical Therapist    Blanca Vaca RN Registered Nurse              Physical Therapy Education     Title: PT OT SLP Therapies (In Progress)     Topic: Physical Therapy (In Progress)     Point: Mobility training (Not Started)     Learner Progress:  Not  documented in this visit.          Point: Home exercise program (Not Started)     Learner Progress:  Not documented in this visit.          Point: Body mechanics (Not Started)     Learner Progress:  Not documented in this visit.          Point: Precautions (Done)     Learning Progress Summary           Patient Acceptance, E,D, VU by SB at 11/18/2022 1408    Comment: pt edu on NPWT, alarm system, POC, d/c plans, what to do if woud vac dressing comes off at home   Family Acceptance, E,D, VU by SB at 11/18/2022 1408    Comment: pt edu on NPWT, alarm system, POC, d/c plans, what to do if woud vac dressing comes off at home                               User Key     Initials Effective Dates Name Provider Type Discipline    SB 06/16/21 -  Susan Perry, PT DPT Physical Therapist PT                Recommendation and Plan  Anticipated Discharge Disposition (PT): home with outpatient therapy services, home with assist  Therapy Frequency (PT): evaluation only  Plan of Care Reviewed With: patient   Progress: no change       Progress: no change  Outcome Evaluation: PT wound eval completed. Pt alert and oriented x4 with c/o 2/10 pain in incisional wound. Pt also reports mild numbness in R hand as well. Wound dressing removed, and wound bed displays controlled bleeding with adipose tissue, exposed muscle and residual silvercel. Wound measures 8 cm x 19 cm x 6 cm with red periowound. 1 piece of white foam and 2 pieces of black foam used to pack wound. Seal achieved. Wound dressing attached to home vac in room and pt educated on home vac use. Hospital vac placed in 3C dirty utility closet. Pt to d/c home today with home vac and OP wound care beginning 11/21.  Plan of Care Reviewed With: patient            Time Calculation   PT Charges     Row Name 11/18/22 1408             Time Calculation    Start Time 0930  plus 15 min for checking back on patient later in day for a total of 135 min  -SB      Stop Time 1130  -SB      Time  Calculation (min) 120 min  -SB      PT Received On 11/18/22  -SB            User Key  (r) = Recorded By, (t) = Taken By, (c) = Cosigned By    Initials Name Provider Type    Susan Gaytan, PT DPT Physical Therapist                  Therapy Charges for Today     Code Description Service Date Service Provider Modifiers Qty    36458337298 HC PT NEG PRESS WOUND OVER 50SQCM DME4 11/18/2022 Susan Perry, PT DPT GP 1    [id number]  PT EVAL LOW COMPLEXITY 4 11/18/2022 Susan Perry, PT DPT GP 1            PT G-Codes  Outcome Measure Options: AM-PAC 6 Clicks Basic Mobility (PT)  AM-PAC 6 Clicks Score (PT): 24       Susan Perry PT DPT  11/18/2022

## 2022-11-18 NOTE — PROGRESS NOTES
Griselda Arteaga MD - General Surgery  Progress Note     LOS: 0 days   Patient Care Team:  Nolberto Pelayo APRN as PCP - General (Family Medicine)  Griselda Arteaga MD as Consulting Physician (General Surgery)      Subjective     Interval History:      No acute events. Pain well controlled. Tolerating diet.      Objective     Vital Signs  Temp:  [98.1 °F (36.7 °C)-99.7 °F (37.6 °C)] 99.7 °F (37.6 °C)  Heart Rate:  [83-99] 99  Resp:  [16-18] 16  BP: (130-156)/(61-86) 156/85    Physical Exam:  General appearance - alert, well appearing, and in no distress  Mental status - alert, oriented to person, place, and time  Eyes - pupils equal and reactive, extraocular eye movements intact  Neck - supple, no significant adenopathy  Chest - right sided wound vac in place. 50mL sanguinous drainage in canister   Heart - normal rate and regular rhythm  Abdomen - soft, nontender, nondistended, no masses or organomegaly  Neurological - alert, oriented, normal speech, no focal findings or movement disorder noted  Musculoskeletal - no joint tenderness, deformity or swelling      Results Review:    Lab Results (last 24 hours)     ** No results found for the last 24 hours. **        Imaging Results (Last 24 Hours)     ** No results found for the last 24 hours. **            Assessment & Plan       Ms. Pelayo is a 77 year old female POD 1 s/p modified radical right mastectomy with wound vac placement. PRN pain and nausea control. Regular diet. Encourage ambulation. Plan for DC home when wound vac approved.       Griselda Arteaga MD  11/18/22  08:43 CST

## 2022-11-18 NOTE — PLAN OF CARE
Goal Outcome Evaluation:  Plan of Care Reviewed With: patient        Progress: improving   VSS, ambulating, passing flatus. Refused heparin, education given on use of heparin for preventative measures, pt verbalized understanding and still refused. Wound vac in place, SCDs in place.

## 2022-11-18 NOTE — PLAN OF CARE
Goal Outcome Evaluation:  Plan of Care Reviewed With: patient        Progress: no change  Outcome Evaluation: PT wound eval completed. Pt alert and oriented x4 with c/o 2/10 pain in incisional wound. Pt also reports mild numbness in R hand as well. Wound dressing removed, and wound bed displays controlled bleeding with adipose tissue, exposed muscle and residual silvercel. Wound measures 8 cm x 19 cm x 6 cm with red periowound. 1 piece of white foam and 2 pieces of black foam used to pack wound. Seal achieved. Wound dressing attached to home vac in room and pt educated on home vac use. Hospital vac placed in 3C dirty utility closet. Pt to d/c home today with home vac and OP wound care beginning 11/21.

## 2022-11-21 ENCOUNTER — OFFICE VISIT (OUTPATIENT)
Dept: SURGERY | Facility: CLINIC | Age: 77
End: 2022-11-21

## 2022-11-21 ENCOUNTER — OFFICE VISIT (OUTPATIENT)
Dept: WOUND CARE | Facility: HOSPITAL | Age: 77
End: 2022-11-21

## 2022-11-21 ENCOUNTER — APPOINTMENT (OUTPATIENT)
Dept: WOUND CARE | Facility: HOSPITAL | Age: 77
End: 2022-11-21

## 2022-11-21 VITALS
HEART RATE: 78 BPM | TEMPERATURE: 97.5 F | WEIGHT: 158.73 LBS | SYSTOLIC BLOOD PRESSURE: 160 MMHG | BODY MASS INDEX: 31.16 KG/M2 | HEIGHT: 60 IN | DIASTOLIC BLOOD PRESSURE: 90 MMHG

## 2022-11-21 DIAGNOSIS — N63.10 MASS OF RIGHT BREAST, UNSPECIFIED QUADRANT: Primary | ICD-10-CM

## 2022-11-21 DIAGNOSIS — C50.611 MALIGNANT NEOPLASM OF AXILLARY TAIL OF RIGHT FEMALE BREAST, UNSPECIFIED ESTROGEN RECEPTOR STATUS: ICD-10-CM

## 2022-11-21 DIAGNOSIS — G89.18 OTHER ACUTE POSTPROCEDURAL PAIN: ICD-10-CM

## 2022-11-21 DIAGNOSIS — Z48.89 ENCOUNTER FOR OTHER SPECIFIED SURGICAL AFTERCARE: ICD-10-CM

## 2022-11-21 DIAGNOSIS — C50.611 MALIGNANT NEOPLASM OF AXILLARY TAIL OF RIGHT FEMALE BREAST, UNSPECIFIED ESTROGEN RECEPTOR STATUS: Primary | ICD-10-CM

## 2022-11-21 DIAGNOSIS — S21.001D UNSPECIFIED OPEN WOUND OF RIGHT BREAST, SUBSEQUENT ENCOUNTER: ICD-10-CM

## 2022-11-21 PROCEDURE — 99204 OFFICE O/P NEW MOD 45 MIN: CPT | Performed by: NURSE PRACTITIONER

## 2022-11-21 PROCEDURE — 97606 NEG PRS WND THER DME>50 SQCM: CPT

## 2022-11-21 PROCEDURE — G0463 HOSPITAL OUTPT CLINIC VISIT: HCPCS

## 2022-11-21 PROCEDURE — 99024 POSTOP FOLLOW-UP VISIT: CPT | Performed by: STUDENT IN AN ORGANIZED HEALTH CARE EDUCATION/TRAINING PROGRAM

## 2022-11-21 RX ORDER — OXYCODONE HYDROCHLORIDE 5 MG/1
5 TABLET ORAL EVERY 8 HOURS PRN
Qty: 15 TABLET | Refills: 0 | Status: SHIPPED | OUTPATIENT
Start: 2022-11-21 | End: 2023-01-23

## 2022-11-22 RX ORDER — KETOROLAC TROMETHAMINE 10 MG/1
10 TABLET, FILM COATED ORAL EVERY 8 HOURS PRN
Qty: 15 TABLET | Refills: 0 | Status: SHIPPED | OUTPATIENT
Start: 2022-11-22 | End: 2022-11-27

## 2022-11-22 NOTE — PROGRESS NOTES
"Patient: Tiffanie Pelayo    YOB: 1945    Date: 11/21/2022    Primary Care Provider: Nolberto Pelayo APRN    Vital Signs:   Vitals:    11/21/22 1402   BP: 160/90   BP Location: Left arm   Patient Position: Sitting   Cuff Size: Adult   Pulse: 78   Temp: 97.5 °F (36.4 °C)   Weight: 72 kg (158 lb 11.7 oz)   Height: 152 cm (59.84\")       She is s/p right modified radical mastectomy with wound vac placement. She had issues this weekend with the wound vac not holding suction. She was upset that PT did not \"deaden it\" before they changed it on Friday prior to discharge. She wanted to discuss another option besides wound vac. We discussed wet to dry dressings and the need to change them twice a day. She agreed to do a wound vac change with wound care today and see how that goes. She is otherwise doing well.     Results Review:  I reviewed the patient's new clinical results.        Assessment / Plan:    Diagnoses and all orders for this visit:    1. 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, unspecified estrogen receptor status (HCC) (Primary)      Follow up as scheduled.     Electronically signed by Griselda Arteaga MD  11/22/22  13:37 CST                  "

## 2022-11-23 ENCOUNTER — OFFICE VISIT (OUTPATIENT)
Dept: WOUND CARE | Facility: HOSPITAL | Age: 77
End: 2022-11-23

## 2022-11-23 PROCEDURE — 97606 NEG PRS WND THER DME>50 SQCM: CPT

## 2022-11-25 ENCOUNTER — OFFICE VISIT (OUTPATIENT)
Dept: WOUND CARE | Facility: HOSPITAL | Age: 77
End: 2022-11-25

## 2022-11-25 PROCEDURE — 97606 NEG PRS WND THER DME>50 SQCM: CPT

## 2022-11-28 ENCOUNTER — OFFICE VISIT (OUTPATIENT)
Dept: WOUND CARE | Facility: HOSPITAL | Age: 77
End: 2022-11-28

## 2022-11-28 DIAGNOSIS — G89.18 OTHER ACUTE POSTPROCEDURAL PAIN: ICD-10-CM

## 2022-11-28 DIAGNOSIS — N63.10 MASS OF RIGHT BREAST, UNSPECIFIED QUADRANT: Primary | ICD-10-CM

## 2022-11-28 DIAGNOSIS — C50.611 MALIGNANT NEOPLASM OF AXILLARY TAIL OF RIGHT FEMALE BREAST, UNSPECIFIED ESTROGEN RECEPTOR STATUS: ICD-10-CM

## 2022-11-28 DIAGNOSIS — Z48.89 ENCOUNTER FOR OTHER SPECIFIED SURGICAL AFTERCARE: ICD-10-CM

## 2022-11-28 DIAGNOSIS — S21.001D UNSPECIFIED OPEN WOUND OF RIGHT BREAST, SUBSEQUENT ENCOUNTER: ICD-10-CM

## 2022-11-28 PROCEDURE — G0463 HOSPITAL OUTPT CLINIC VISIT: HCPCS

## 2022-11-28 PROCEDURE — 99212 OFFICE O/P EST SF 10 MIN: CPT | Performed by: NURSE PRACTITIONER

## 2022-11-28 PROCEDURE — 97606 NEG PRS WND THER DME>50 SQCM: CPT

## 2022-11-28 RX ORDER — OXYCODONE HYDROCHLORIDE 5 MG/1
5 TABLET ORAL EVERY 8 HOURS PRN
Qty: 10 TABLET | Refills: 0 | Status: SHIPPED | OUTPATIENT
Start: 2022-11-28 | End: 2023-01-23

## 2022-11-30 ENCOUNTER — OFFICE VISIT (OUTPATIENT)
Dept: WOUND CARE | Facility: HOSPITAL | Age: 77
End: 2022-11-30

## 2022-11-30 LAB
CYTO UR: NORMAL
LAB AP CASE REPORT: NORMAL
LAB AP CLINICAL INFORMATION: NORMAL
LAB AP SYNOPTIC CHECKLIST: NORMAL
Lab: NORMAL
PATH REPORT.FINAL DX SPEC: NORMAL
PATH REPORT.GROSS SPEC: NORMAL

## 2022-11-30 PROCEDURE — 97606 NEG PRS WND THER DME>50 SQCM: CPT

## 2022-12-02 ENCOUNTER — OFFICE VISIT (OUTPATIENT)
Dept: WOUND CARE | Facility: HOSPITAL | Age: 77
End: 2022-12-02

## 2022-12-02 PROCEDURE — 97606 NEG PRS WND THER DME>50 SQCM: CPT

## 2022-12-05 ENCOUNTER — OFFICE VISIT (OUTPATIENT)
Dept: WOUND CARE | Facility: HOSPITAL | Age: 77
End: 2022-12-05

## 2022-12-05 DIAGNOSIS — Z48.89 ENCOUNTER FOR OTHER SPECIFIED SURGICAL AFTERCARE: ICD-10-CM

## 2022-12-05 DIAGNOSIS — S21.001D UNSPECIFIED OPEN WOUND OF RIGHT BREAST, SUBSEQUENT ENCOUNTER: ICD-10-CM

## 2022-12-05 DIAGNOSIS — C50.611 MALIGNANT NEOPLASM OF AXILLARY TAIL OF RIGHT FEMALE BREAST, UNSPECIFIED ESTROGEN RECEPTOR STATUS: ICD-10-CM

## 2022-12-05 DIAGNOSIS — G89.18 OTHER ACUTE POSTPROCEDURAL PAIN: ICD-10-CM

## 2022-12-05 PROCEDURE — G0463 HOSPITAL OUTPT CLINIC VISIT: HCPCS

## 2022-12-05 PROCEDURE — 99212 OFFICE O/P EST SF 10 MIN: CPT | Performed by: NURSE PRACTITIONER

## 2022-12-07 ENCOUNTER — OFFICE VISIT (OUTPATIENT)
Dept: WOUND CARE | Facility: HOSPITAL | Age: 77
End: 2022-12-07

## 2022-12-07 PROCEDURE — 97606 NEG PRS WND THER DME>50 SQCM: CPT

## 2022-12-09 ENCOUNTER — OFFICE VISIT (OUTPATIENT)
Dept: WOUND CARE | Facility: HOSPITAL | Age: 77
End: 2022-12-09

## 2022-12-09 PROCEDURE — 97606 NEG PRS WND THER DME>50 SQCM: CPT

## 2022-12-12 ENCOUNTER — OFFICE VISIT (OUTPATIENT)
Dept: WOUND CARE | Facility: HOSPITAL | Age: 77
End: 2022-12-12

## 2022-12-12 DIAGNOSIS — S21.001D UNSPECIFIED OPEN WOUND OF RIGHT BREAST, SUBSEQUENT ENCOUNTER: ICD-10-CM

## 2022-12-12 DIAGNOSIS — Z48.89 ENCOUNTER FOR OTHER SPECIFIED SURGICAL AFTERCARE: ICD-10-CM

## 2022-12-12 DIAGNOSIS — C50.611 MALIGNANT NEOPLASM OF AXILLARY TAIL OF RIGHT FEMALE BREAST, UNSPECIFIED ESTROGEN RECEPTOR STATUS: ICD-10-CM

## 2022-12-12 PROCEDURE — 97605 NEG PRS WND THER DME<=50SQCM: CPT

## 2022-12-12 PROCEDURE — 99212 OFFICE O/P EST SF 10 MIN: CPT | Performed by: NURSE PRACTITIONER

## 2022-12-14 ENCOUNTER — OFFICE VISIT (OUTPATIENT)
Dept: WOUND CARE | Facility: HOSPITAL | Age: 77
End: 2022-12-14

## 2022-12-14 PROCEDURE — G0463 HOSPITAL OUTPT CLINIC VISIT: HCPCS

## 2022-12-16 ENCOUNTER — OFFICE VISIT (OUTPATIENT)
Dept: WOUND CARE | Facility: HOSPITAL | Age: 77
End: 2022-12-16

## 2022-12-16 PROCEDURE — G0463 HOSPITAL OUTPT CLINIC VISIT: HCPCS

## 2022-12-19 ENCOUNTER — OFFICE VISIT (OUTPATIENT)
Dept: WOUND CARE | Facility: HOSPITAL | Age: 77
End: 2022-12-19

## 2022-12-19 DIAGNOSIS — C50.611 MALIGNANT NEOPLASM OF AXILLARY TAIL OF RIGHT FEMALE BREAST, UNSPECIFIED ESTROGEN RECEPTOR STATUS: ICD-10-CM

## 2022-12-19 DIAGNOSIS — Z48.89 ENCOUNTER FOR OTHER SPECIFIED SURGICAL AFTERCARE: ICD-10-CM

## 2022-12-19 DIAGNOSIS — G89.18 OTHER ACUTE POSTPROCEDURAL PAIN: ICD-10-CM

## 2022-12-19 DIAGNOSIS — S21.001D UNSPECIFIED OPEN WOUND OF RIGHT BREAST, SUBSEQUENT ENCOUNTER: ICD-10-CM

## 2022-12-19 PROCEDURE — 99212 OFFICE O/P EST SF 10 MIN: CPT | Performed by: NURSE PRACTITIONER

## 2022-12-19 PROCEDURE — 97605 NEG PRS WND THER DME<=50SQCM: CPT

## 2022-12-21 ENCOUNTER — OFFICE VISIT (OUTPATIENT)
Dept: WOUND CARE | Facility: HOSPITAL | Age: 77
End: 2022-12-21

## 2022-12-21 PROCEDURE — 97605 NEG PRS WND THER DME<=50SQCM: CPT

## 2022-12-23 ENCOUNTER — OFFICE VISIT (OUTPATIENT)
Dept: WOUND CARE | Facility: HOSPITAL | Age: 77
End: 2022-12-23

## 2022-12-23 PROCEDURE — 97605 NEG PRS WND THER DME<=50SQCM: CPT

## 2022-12-26 DIAGNOSIS — N63.10 MASS OF RIGHT BREAST, UNSPECIFIED QUADRANT: Primary | ICD-10-CM

## 2022-12-27 ENCOUNTER — OFFICE VISIT (OUTPATIENT)
Dept: WOUND CARE | Facility: HOSPITAL | Age: 77
End: 2022-12-27

## 2022-12-27 DIAGNOSIS — G89.18 OTHER ACUTE POSTPROCEDURAL PAIN: ICD-10-CM

## 2022-12-27 DIAGNOSIS — S21.001D UNSPECIFIED OPEN WOUND OF RIGHT BREAST, SUBSEQUENT ENCOUNTER: ICD-10-CM

## 2022-12-27 DIAGNOSIS — C50.611 MALIGNANT NEOPLASM OF AXILLARY TAIL OF RIGHT FEMALE BREAST, UNSPECIFIED ESTROGEN RECEPTOR STATUS: ICD-10-CM

## 2022-12-27 DIAGNOSIS — Z48.89 ENCOUNTER FOR OTHER SPECIFIED SURGICAL AFTERCARE: ICD-10-CM

## 2022-12-27 PROCEDURE — G0463 HOSPITAL OUTPT CLINIC VISIT: HCPCS

## 2022-12-27 PROCEDURE — 99214 OFFICE O/P EST MOD 30 MIN: CPT | Performed by: NURSE PRACTITIONER

## 2022-12-30 ENCOUNTER — OFFICE VISIT (OUTPATIENT)
Dept: WOUND CARE | Facility: HOSPITAL | Age: 77
End: 2022-12-30
Payer: MEDICARE

## 2022-12-30 PROCEDURE — G0463 HOSPITAL OUTPT CLINIC VISIT: HCPCS

## 2023-01-03 ENCOUNTER — APPOINTMENT (OUTPATIENT)
Dept: WOUND CARE | Facility: HOSPITAL | Age: 78
End: 2023-01-03
Payer: MEDICARE

## 2023-01-03 DIAGNOSIS — S21.001D UNSPECIFIED OPEN WOUND OF RIGHT BREAST, SUBSEQUENT ENCOUNTER: ICD-10-CM

## 2023-01-03 DIAGNOSIS — Z48.89 ENCOUNTER FOR OTHER SPECIFIED SURGICAL AFTERCARE: ICD-10-CM

## 2023-01-03 DIAGNOSIS — G89.18 OTHER ACUTE POSTPROCEDURAL PAIN: ICD-10-CM

## 2023-01-03 DIAGNOSIS — C50.611 MALIGNANT NEOPLASM OF AXILLARY TAIL OF RIGHT FEMALE BREAST, UNSPECIFIED ESTROGEN RECEPTOR STATUS: ICD-10-CM

## 2023-01-03 PROCEDURE — 99213 OFFICE O/P EST LOW 20 MIN: CPT | Performed by: SURGERY

## 2023-01-03 PROCEDURE — G0463 HOSPITAL OUTPT CLINIC VISIT: HCPCS

## 2023-01-04 DIAGNOSIS — G89.18 ACUTE POST-OPERATIVE PAIN: Primary | ICD-10-CM

## 2023-01-04 RX ORDER — HYDROCODONE BITARTRATE AND ACETAMINOPHEN 7.5; 325 MG/1; MG/1
1 TABLET ORAL EVERY 6 HOURS PRN
Qty: 12 TABLET | Refills: 0 | Status: SHIPPED | OUTPATIENT
Start: 2023-01-04 | End: 2023-01-07

## 2023-01-05 ENCOUNTER — OFFICE VISIT (OUTPATIENT)
Dept: SURGERY | Facility: CLINIC | Age: 78
End: 2023-01-05
Payer: MEDICARE

## 2023-01-05 VITALS
HEART RATE: 83 BPM | SYSTOLIC BLOOD PRESSURE: 178 MMHG | DIASTOLIC BLOOD PRESSURE: 91 MMHG | WEIGHT: 158.73 LBS | BODY MASS INDEX: 31.16 KG/M2 | TEMPERATURE: 96.9 F | HEIGHT: 60 IN

## 2023-01-05 DIAGNOSIS — C50.919 METASTATIC BREAST CANCER: Primary | ICD-10-CM

## 2023-01-05 DIAGNOSIS — E66.09 CLASS 1 OBESITY DUE TO EXCESS CALORIES WITH BODY MASS INDEX (BMI) OF 31.0 TO 31.9 IN ADULT, UNSPECIFIED WHETHER SERIOUS COMORBIDITY PRESENT: ICD-10-CM

## 2023-01-05 PROCEDURE — 99024 POSTOP FOLLOW-UP VISIT: CPT | Performed by: STUDENT IN AN ORGANIZED HEALTH CARE EDUCATION/TRAINING PROGRAM

## 2023-01-07 NOTE — PATIENT INSTRUCTIONS
BMI for Adults  What is BMI?  Body mass index (BMI) is a number that is calculated from a person's weight and height. BMI can help estimate how much of a person's weight is composed of fat. BMI does not measure body fat directly. Rather, it is an alternative to procedures that directly measure body fat, which can be difficult and expensive.  BMI can help identify people who may be at higher risk for certain medical problems.  What are BMI measurements used for?  BMI is used as a screening tool to identify possible weight problems. It helps determine whether a person is obese, overweight, a healthy weight, or underweight.  BMI is useful for:  Identifying a weight problem that may be related to a medical condition or may increase the risk for medical problems.  Promoting changes, such as changes in diet and exercise, to help reach a healthy weight. BMI screening can be repeated to see if these changes are working.  How is BMI calculated?  BMI involves measuring your weight in relation to your height. Both height and weight are measured, and the BMI is calculated from those numbers. This can be done either in English (U[place] or metric measurements. Note that charts and online BMI calculators are available to help you find your BMI quickly and easily without having to do these calculations yourself.  To calculate your BMI in English ([place].S[place] measurements:    Measure your weight in pounds (lb).  Multiply the number of pounds by 703.  For example, for a person who weighs 180 lb, multiply that number by 703, which equals 126,540.  Measure your height in inches. Then multiply that number by itself to get a measurement called \"inches squared.\"  For example, for a person who is 70 inches tall, the \"inches squared\" measurement is 70 inches x 70 inches, which equals 4,900 inches squared.  Divide the total from step 2 (number of lb x 703) by the total from step 3 (inches squared): 126,540 ÷ 4,900 = 25.8. This is your BMI.    To  calculate your BMI in metric measurements:  Measure your weight in kilograms (kg).  Measure your height in meters (m). Then multiply that number by itself to get a measurement called \"meters squared.\"  For example, for a person who is 1.75 m tall, the \"meters squared\" measurement is 1.75 m x 1.75 m, which is equal to 3.1 meters squared.  Divide the number of kilograms (your weight) by the meters squared number. In this example: 70 ÷ 3.1 = 22.6. This is your BMI.  What do the results mean?  BMI charts are used to identify whether you are underweight, normal weight, overweight, or obese. The following guidelines will be used:  Underweight: BMI less than 18.5.  Normal weight: BMI between 18.5 and 24.9.  Overweight: BMI between 25 and 29.9.  Obese: BMI of 30 or above.  Keep these notes in mind:  Weight includes both fat and muscle, so someone with a muscular build, such as an athlete, may have a BMI that is higher than 24.9. In cases like these, BMI is not an accurate measure of body fat.  To determine if excess body fat is the cause of a BMI of 25 or higher, further assessments may need to be done by a health care provider.  BMI is usually interpreted in the same way for men and women.  Where to find more information  For more information about BMI, including tools to quickly calculate your BMI, go to these websites:  Centers for Disease Control and Prevention: www.cdc.gov  American Heart Association: www.heart.org  National Heart, Lung, and Blood [place]: www.nhlbi.nih.gov  Summary  Body mass index (BMI) is a number that is calculated from a person's weight and height.  BMI may help estimate how much of a person's weight is composed of fat. BMI can help identify those who may be at higher risk for certain medical problems.  BMI can be measured using English measurements or metric measurements.  BMI charts are used to identify whether you are underweight, normal weight, overweight, or obese.  This information is not  intended to replace advice given to you by your health care provider. Make sure you discuss any questions you have with your health care provider.  Document Revised: 09/09/2020 Document Reviewed: 07/17/2020  Elsevier Patient Education © 2021 [employer] Inc[employer]

## 2023-01-07 NOTE — PROGRESS NOTES
Patient: Tiffanie Pelayo    YOB: 1945    Date: 01/05/2023    Primary Care Provider: Nolberto Pelayo APRN    Vital Signs:   Vitals:    01/05/23 1429   BP: 178/91   BP Location: Left arm   Patient Position: Sitting   Cuff Size: Adult   Pulse: 83   Temp: 96.9 °F (36.1 °C)   Weight: 72 kg (158 lb 11.7 oz)   Height: 152 cm (59.84\")       Ms. Pelayo is a 77 year old female s/p right modified radical mastectomy on 11/16/22 with wound vac placement. She follows with wound care and the vac has since been removed. The patient denies fevers, chills, nausea, vomiting, and excessive pain.     Results Review:  I reviewed the patient's new clinical results.    Tissue Pathology Exam (11/16/2022 14:56)  97% ER+ 95% AR +, Her2 2+     Assessment / Plan:    Diagnoses and all orders for this visit:    1. Metastatic breast cancer (HCC) (Primary)    2. Class 1 obesity due to excess calories with body mass index (BMI) of 31.0 to 31.9 in adult, unspecified whether serious comorbidity present      Ms. Pelayo is a 77 year old female s/p right modified radical mastectomy on 11/16/22 for a bleeding fungating lesion. Her wound vac has been removed. I placed  Referral to oncology when her ER/AR and Her2 resulted however she does not have an appointment with them yet. My office has called their office and they will call her for an appointment soon. She adamantly refuses chemotherapy and radiation however she is open to anti-hormone therapy after our discussion. I have communicated this to Dr. Gar and Dr. Murillo. Follow up with me in 2 months.     Electronically signed by Griselda Arteaga MD  01/07/23  16:30 CST

## 2023-01-09 ENCOUNTER — OFFICE VISIT (OUTPATIENT)
Dept: WOUND CARE | Facility: HOSPITAL | Age: 78
End: 2023-01-09
Payer: MEDICARE

## 2023-01-09 DIAGNOSIS — Z48.89 ENCOUNTER FOR OTHER SPECIFIED SURGICAL AFTERCARE: ICD-10-CM

## 2023-01-09 DIAGNOSIS — C50.611 MALIGNANT NEOPLASM OF AXILLARY TAIL OF RIGHT FEMALE BREAST, UNSPECIFIED ESTROGEN RECEPTOR STATUS: ICD-10-CM

## 2023-01-09 DIAGNOSIS — S21.001D UNSPECIFIED OPEN WOUND OF RIGHT BREAST, SUBSEQUENT ENCOUNTER: ICD-10-CM

## 2023-01-09 DIAGNOSIS — G89.18 OTHER ACUTE POSTPROCEDURAL PAIN: ICD-10-CM

## 2023-01-09 PROCEDURE — 11042 DBRDMT SUBQ TIS 1ST 20SQCM/<: CPT | Performed by: NURSE PRACTITIONER

## 2023-01-16 NOTE — PROGRESS NOTES
[facility] HEMATOLOGY & ONCOLOGY [place]  [address]  [place] [address] [phone]    Patient Name: Tiffanie Pelayo  Encounter Date: 01/23/2023  YOB: 1945  Patient Number: [id number]    Initial Note    REASON FOR CONSULTATION: Tiffanie Pelayo is a pleasant 77 y.o.  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  She is seen with daughter, Jennie.  History is obtained from patient. History is considered to be accurate.      HISTORY OF PRESENT ILLNESS: She presented with a large exophytic breast mass noted for the last 10 to 15 years that was bleeding.  Patient stated she does not like to see doctors.  Patient had more than doubled in size in the last few months.    She has seen Dr. Arteaga 11/06/2022 for evaluation.  Examination showed a very large exophytic mass almost completely replaces the right breast, red and purple in coloration.  It is fixed to the chest wall.  There is palpable right axillary adenopathy.  PET ordered.  If she has metastatic disease, plan for salvage mastectomy with wound VAC placement.    PET 11/07/2022. Very large hypermetabolic 9.2 x 6.5 cm lobulated RIGHT breast mass invading the RIGHT breast skin, with large exophytic component and diffuse RIGHT breast skin thickening.  Multiple large RIGHT axillary masses compatible with ana metastasis. There is also an enlarged mildly hypermetabolic left-sided  axillary lymph node which is highly suspicious for contralateral ana metastasis.   Enlarged hypermetabolic subcarinal mediastinal lymph node is highly concerning for mediastinal ana metastasis.  Single ill-defined hypermetabolic RIGHT pelvic bone lesion, concerning for osseous metastasis.    Chest x-ray 11/09/2022.  All granulomatous disease.  Mild bronchial wall thickening.  Mild cardiomegaly.   Hypoventilation and vascular crowding.    CBC 2022 was normal.  CMP revealed a GFR of 102.7 mL/min.    She had undergone right modified radical mastectomy 2022.     Pathology report 2022. Right breast with axillary lymph nodes, mastectomy:  Invasive carcinoma with ductal and lobular features, grade 3 (109 mm).  Tumor extends to inferior, superior, and posterior margins. AJCC pathologic stage: (m)pT4d  N1a    She was discharged 2022.    Follow up with Dr. Arteaga 2023. Wound vac removed. She declined chemo and radiation. She is open for antiestrogen therapy.     . Menarche at 14.  Age of first delivery at 22.  She had taken birth control for the last 15 years. Menopause in her mid 50s.  No hormone replacement therapy.  No family history of breast cancer or ovarian cancer.    With above background, she is seen.    LABS    Lab Results - Last 18 Months   Lab Units 22  1422   HEMOGLOBIN g/dL 13.1   HEMATOCRIT % 39.6   MCV fL 91.9   WBC 10*3/mm3 6.40   RDW % 12.8   MPV fL 9.8   PLATELETS 10*3/mm3 186       Lab Results - Last 18 Months   Lab Units 22  1422   GLUCOSE mg/dL 101*   SODIUM mmol/L 142   POTASSIUM mmol/L 3.9   CO2 mmol/L 29.0   CHLORIDE mmol/L 106   ANION GAP mmol/L 7.0   CREATININE mg/dL 0.41*   BUN mg/dL 11   BUN / CREAT RATIO  26.8*   CALCIUM mg/dL 9.5   ALK PHOS U/L 83   TOTAL PROTEIN g/dL 7.1   ALT (SGPT) U/L 14   AST (SGOT) U/L 19   BILIRUBIN mg/dL 0.3   ALBUMIN g/dL 4.50   GLOBULIN gm/dL 2.6       No results for input(s): MSPIKE, KAPPALAMB, IGLFLC, URICACID, FREEKAPPAL, CEA, LDH, REFLABREPO in the last 25934 hours.    No results for input(s): IRON, TIBC, LABIRON, FERRITIN, B9OJHKI, TSH, FOLATE in the last 38838 hours.    Invalid input(s): VITB12      PAST MEDICAL HISTORY:  ALLERGIES:  Allergies   Allergen Reactions   • Keflex [Cephalexin] Anaphylaxis     CURRENT MEDICATIONS:  Outpatient Encounter Medications as of 2023   Medication Sig Dispense Refill    • acetaminophen (Tylenol) 325 MG tablet Take 3 tablets by mouth Every 8 (Eight) Hours. Take every 8 hours for 3 days then take prn as needed. 100 tablet 2   • Ascorbic Acid (VITAMIN C PO) Take 8 tablets by mouth Daily.     • B Complex Vitamins (vitamin b complex) capsule capsule Take 1 capsule by mouth Daily.     • Beta Glucan powder Take 3 capsules by mouth Daily.     • ibuprofen (Motrin IB) 200 MG tablet Take 3 tablets by mouth Every 8 (Eight) Hours. Take every 8 hours for three days then take as needed. 100 tablet 2   • NON FORMULARY Take  by mouth Daily. 1 scoop of magnesium dietary supplement powder mixed in liquid.     • Probiotic Product (PROBIOTIC DAILY PO) Take  by mouth Daily.     • vitamin D3 125 MCG (5000 UT) capsule capsule Take 1 capsule by mouth Daily.     • vitamin E 200 UNIT capsule Take 1 capsule by mouth Daily.     • [DISCONTINUED] acetaminophen (TYLENOL) 160 MG/5ML solution Take 15 mg/kg by mouth Every 4 (Four) Hours As Needed for Mild Pain.     • [DISCONTINUED] ondansetron (Zofran) 4 MG tablet Take 1 tablet by mouth Every 8 (Eight) Hours As Needed for Nausea or Vomiting. 15 tablet 0   • [DISCONTINUED] ondansetron (Zofran) 4 MG tablet Take 1 tablet by mouth Every 8 (Eight) Hours As Needed for Nausea or Vomiting. 15 tablet 0   • [DISCONTINUED] oxyCODONE (Roxicodone) 5 MG immediate release tablet Take 1 tablet by mouth Every 8 (Eight) Hours As Needed for Severe Pain. 15 tablet 0   • [DISCONTINUED] oxyCODONE (Roxicodone) 5 MG immediate release tablet Take 1 tablet by mouth Every 8 (Eight) Hours As Needed for Severe Pain. 15 tablet 0   • [DISCONTINUED] oxyCODONE (Roxicodone) 5 MG immediate release tablet Take 1 tablet by mouth Every 8 (Eight) Hours As Needed for Severe Pain. 10 tablet 0     No facility-administered encounter medications on file as of 1/23/2023.     ADULT ILLNESSES:  Patient Active Problem List   Diagnosis Code   • Mass of right breast N63.10   • Malignant neoplasm of axillary tail  "of right female breast (HCC) C50.611   • 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, unspecified estrogen receptor status (HCC) C50.611     SURGERIES:  Past Surgical History:   Procedure Laterality Date   •  SECTION     • GALLBLADDER SURGERY     • MASTECTOMY Right 2022    Procedure: RIGHT BREAST MASTECTOMY MODIFIED RADICAL WITH WOUND VAC PLACEMENT;  Surgeon: Griselda Arteaga MD;  Location: [facility];  Service: General;  Laterality: Right;   • TONSILLECTOMY       HEALTH MAINTENANCE ITEMS:  Health Maintenance Due   Topic Date Due   • DXA SCAN  Never done   • COLORECTAL CANCER SCREENING  Never done   • COVID-19 Vaccine (1) Never done   • TDAP/TD VACCINES (1 - Tdap) Never done   • ZOSTER VACCINE (1 of 2) Never done   • Pneumococcal Vaccine 65+ (1 - PCV) Never done   • INFLUENZA VACCINE  Never done   • HEPATITIS C SCREENING  Never done   • ANNUAL WELLNESS VISIT  Never done       <no information>  Last Completed Colonoscopy     This patient has no relevant Health Maintenance data.          There is no immunization history on file for this patient.  Last Completed Mammogram     This patient has no relevant Health Maintenance data.            FAMILY HISTORY:  No family history on file.  SOCIAL HISTORY:  Social History     Socioeconomic History   • Marital status:    Tobacco Use   • Smoking status: Never   • Smokeless tobacco: Never   Vaping Use   • Vaping Use: Never used   Substance and Sexual Activity   • Alcohol use: Never   • Drug use: Never   • Sexual activity: Never       REVIEW OF SYSTEMS:  Review of Systems   Constitutional: Negative for fatigue, fever and unexpected weight loss.        \"I feel good.\"   HENT: Negative for congestion, mouth sores and trouble swallowing.    Eyes: Negative for redness and visual disturbance.   Respiratory: Negative for cough, shortness of breath and wheezing.    Cardiovascular: Negative for chest pain and leg swelling.   Gastrointestinal: " "Negative for abdominal pain, nausea and vomiting.   Endocrine: Negative for polydipsia.   Genitourinary: Negative for difficulty urinating, dysuria and flank pain.   Musculoskeletal: Negative for gait problem and neck stiffness.   Skin: Negative for pallor.   Allergic/Immunologic: Negative for food allergies.   Neurological: Negative for speech difficulty, weakness and confusion.   Hematological: Positive for adenopathy. Does not bruise/bleed easily.   Psychiatric/Behavioral: Negative for agitation and depressed mood.       /88   Pulse 90   Temp 98.1 °F (36.7 °C)   Resp 18   Ht 152 cm (59.84\")   Wt 72 kg (158 lb 11.2 oz)   SpO2 96%   Breastfeeding No   BMI 31.16 kg/m²  Body surface area is 1.69 meters squared.  Pain Score    01/23/23 1530   PainSc: 0-No pain       Physical Exam:  Physical Exam  Vitals reviewed.   Constitutional:       General: She is not in acute distress.  HENT:      Head: Normocephalic and atraumatic.   Eyes:      General: No scleral icterus.  Cardiovascular:      Rate and Rhythm: Normal rate.   Pulmonary:      Effort: No respiratory distress.      Breath sounds: No wheezing or rales.      Comments: No breast exam per patient.  Abdominal:      General: Bowel sounds are normal.      Palpations: Abdomen is soft.      Tenderness: There is no abdominal tenderness.   Musculoskeletal:         General: No swelling.      Cervical back: Neck supple.   Skin:     General: Skin is warm.      Coloration: Skin is not pale.   Neurological:      Mental Status: She is alert and oriented to person, place, and time.   Psychiatric:         Mood and Affect: Mood normal.         Behavior: Behavior normal.         Tiffanie Pelayo reports a pain score of 0.         ASSESSMENT:  1.  Breast cancer, right.  Positive for lymphovascular and dermal lymphatic invasion.  Margin positive, posterior, superior and inferior.  AJCC stage:IV (mpT4d, pN1a, cM1, G3).  Tumor size 10.9 cm.  Receptor status: Estrogen 97%, " "progesterone 95% and HER2/erasmo 2+.  Treatment status: Post right modified radical mastectomy 11/16/2022.  Declined radiation and chemotherapy.  Consider antiestrogen therapy and CDK4/6 inhibitor.   2.  Performance status of 0.  3.  Right iliac bone metastasis.  4.  Mediastinal node metastasis.  5.  Contralateral axillary node metastasis.  6.  Healthcare avoidance        PLAN:  1.   regarding the reason for the referral. She has an incurable disease. Patient stated no treatment at this point. \"I want to read about the Femara and Ibrance. I will talk to my homeopathic provider. I will let you know.\"  2.   regarding role of palliative antiestrogen with CDK 4/6 inhibitor.  3.   regarding role of denosumab for bone metastasis.  Patient aware of potential osteonecrosis of the jaw.  4.   regarding her poor overall prognosis.  She has incurable disease  5.  Cancel CBC with differential and CMP.  6.  eRx Ibrance 125 mg once daily for 21 days, followed by 7 days off every 28 day cycle, #21 with 1 refill.  Monitor for adverse reactions specially cytopenias, fatigue, alopecia, rash, pulmonary toxicity and elevated liver function test.  7.  eRx letrozole 2.5 mg p.o. daily #90 with 1 refill.  Monitor for hot flashes and bone loss.  8.  eRx Zofran 8 mg p.o. every 8 hours as needed for nausea and vomiting #60, 2 refills.  9.  eRx Compazine 10 mg p.o. every 4 hours as needed for nausea and vomiting #60, 2 refills.  10. Order denosumab 120 mg subcutaneous every 28 days.  Monitor for osteonecrosis of the jaw.  11.  Weekly CBC with differential when she starts Ibrance.  12.  Advance Care Planning   ACP discussion was held with the patient during this visit. Patient has an advance directive (not in EMR), copy requested.   13.  Continue care per primary care physician at the specialist  14.  Plan of care discussed with patient.  Understand expressed.  Patient agreeable to proceed.  15.  Cancel pathology to " request HER2/erasmo by FISH. She declined active therapy.   16.  Return to office in 8 weeks with CBC with differential and CMP, same day.  17.  Further recommendations pending.      Thank you for the referral.      I have reviewed the assessment and plan and verified the accuracy of it. No changes to assessment and plan since the information was documented. Abdulaziz Gar MD 01/23/23       I spent 65 total minutes, face-to-face, caring for Tiffanie today.  Greater than 50% of this time involved counseling and/or coordination of care as documented within this note regarding the patient's illness(es), pros and cons of various treatment options, instructions and/or risk reduction.          MD Cody Ortiz, APRN

## 2023-01-19 ENCOUNTER — OFFICE VISIT (OUTPATIENT)
Dept: WOUND CARE | Facility: HOSPITAL | Age: 78
End: 2023-01-19
Payer: MEDICARE

## 2023-01-19 DIAGNOSIS — Z48.89 ENCOUNTER FOR OTHER SPECIFIED SURGICAL AFTERCARE: ICD-10-CM

## 2023-01-19 DIAGNOSIS — S21.001D UNSPECIFIED OPEN WOUND OF RIGHT BREAST, SUBSEQUENT ENCOUNTER: ICD-10-CM

## 2023-01-19 DIAGNOSIS — G89.18 OTHER ACUTE POSTPROCEDURAL PAIN: ICD-10-CM

## 2023-01-19 DIAGNOSIS — C50.611 MALIGNANT NEOPLASM OF AXILLARY TAIL OF RIGHT FEMALE BREAST, UNSPECIFIED ESTROGEN RECEPTOR STATUS: ICD-10-CM

## 2023-01-19 PROCEDURE — 11042 DBRDMT SUBQ TIS 1ST 20SQCM/<: CPT | Performed by: NURSE PRACTITIONER

## 2023-01-23 ENCOUNTER — CONSULT (OUTPATIENT)
Dept: ONCOLOGY | Facility: CLINIC | Age: 78
End: 2023-01-23
Payer: MEDICARE

## 2023-01-23 VITALS
SYSTOLIC BLOOD PRESSURE: 168 MMHG | BODY MASS INDEX: 31.16 KG/M2 | HEART RATE: 90 BPM | HEIGHT: 60 IN | RESPIRATION RATE: 18 BRPM | DIASTOLIC BLOOD PRESSURE: 88 MMHG | OXYGEN SATURATION: 96 % | TEMPERATURE: 98.1 F | WEIGHT: 158.7 LBS

## 2023-01-23 DIAGNOSIS — C50.911 BREAST CANCER METASTASIZED TO INTRATHORACIC LYMPH NODE, RIGHT: Primary | ICD-10-CM

## 2023-01-23 DIAGNOSIS — C77.1 BREAST CANCER METASTASIZED TO INTRATHORACIC LYMPH NODE, RIGHT: Primary | ICD-10-CM

## 2023-01-23 PROCEDURE — 99205 OFFICE O/P NEW HI 60 MIN: CPT | Performed by: INTERNAL MEDICINE

## 2023-01-23 NOTE — LETTER
January 23, 2023     Griselda Arteaga MD  [address] [facility]  [address]  [facility] [address]    Patient: Tiffanie Pelayo   YOB: 1945   Date of Visit: 1/23/2023       Dear Dr. Chandler MD:    Thank you for referring Tiffanie Pelayo to me for evaluation. Below are the relevant portions of my assessment and plan of care.    If you have questions, please do not hesitate to call me. I look forward to following Tiffanie along with you.         Sincerely,        Abdulaziz Gar MD        CC: ANISA Gonzalez Winston, MD  01/23/23 1603  Signed  [facility] HEMATOLOGY & ONCOLOGY [address]  [facility] [address]  [phone]    Patient Name: Tiffanie Pelayo  Encounter Date: 01/23/2023  YOB: 1945  Patient Number: [id number]    Initial Note    REASON FOR CONSULTATION: Tiffanie Pelayo is a pleasant 77 y.o.  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  She is seen with daughter, Jennie.  History is obtained from patient. History is considered to be accurate.      HISTORY OF PRESENT ILLNESS: She presented with a large exophytic breast mass noted for the last 10 to 15 years that was bleeding.  Patient stated she does not like to see doctors.  Patient had more than doubled in size in the last few months.    She has seen Dr. Arteaga 11/06/2022 for evaluation.  Examination showed a very large exophytic mass almost completely replaces the right breast, red and purple in coloration.  It is fixed to the chest wall.  There is palpable right axillary adenopathy.  PET ordered.  If she has metastatic disease, plan for salvage mastectomy with wound VAC placement.    PET 11/07/2022. Very large hypermetabolic 9.2 x 6.5 cm lobulated RIGHT breast mass invading the RIGHT breast skin, with large exophytic component and diffuse RIGHT breast  skin thickening.  Multiple large RIGHT axillary masses compatible with ana metastasis. There is also an enlarged mildly hypermetabolic left-sided  axillary lymph node which is highly suspicious for contralateral ana metastasis.   Enlarged hypermetabolic subcarinal mediastinal lymph node is highly concerning for mediastinal ana metastasis.  Single ill-defined hypermetabolic RIGHT pelvic bone lesion, concerning for osseous metastasis.    Chest x-ray 2022.  All granulomatous disease.  Mild bronchial wall thickening.  Mild cardiomegaly.  Hypoventilation and vascular crowding.    CBC 2022 was normal.  CMP revealed a GFR of 102.7 mL/min.    She had undergone right modified radical mastectomy 2022.     Pathology report 2022. Right breast with axillary lymph nodes, mastectomy:  Invasive carcinoma with ductal and lobular features, grade 3 (109 mm).  Tumor extends to inferior, superior, and posterior margins. AJCC pathologic stage: (m)pT4d  N1a    She was discharged 2022.    Follow up with Dr. Arteaga 2023. Wound vac removed. She declined chemo and radiation. She is open for antiestrogen therapy.     . Menarche at 14.  Age of first delivery at 22.  She had taken birth control for the last 15 years. Menopause in her mid 50s.  No hormone replacement therapy.  No family history of breast cancer or ovarian cancer.    With above background, she is seen.    LABS    Lab Results - Last 18 Months   Lab Units 22  1422   HEMOGLOBIN g/dL 13.1   HEMATOCRIT % 39.6   MCV fL 91.9   WBC 10*3/mm3 6.40   RDW % 12.8   MPV fL 9.8   PLATELETS 10*3/mm3 186       Lab Results - Last 18 Months   Lab Units 22  1422   GLUCOSE mg/dL 101*   SODIUM mmol/L 142   POTASSIUM mmol/L 3.9   CO2 mmol/L 29.0   CHLORIDE mmol/L 106   ANION GAP mmol/L 7.0   CREATININE mg/dL 0.41*   BUN mg/dL 11   BUN / CREAT RATIO  26.8*   CALCIUM mg/dL 9.5   ALK PHOS U/L 83   TOTAL PROTEIN g/dL 7.1   ALT (SGPT) U/L 14   AST  (SGOT) U/L 19   BILIRUBIN mg/dL 0.3   ALBUMIN g/dL 4.50   GLOBULIN gm/dL 2.6       No results for input(s): MSPIKE, KAPPALAMB, IGLFLC, URICACID, FREEKAPPAL, CEA, LDH, REFLABREPO in the last 76506 hours.    No results for input(s): IRON, TIBC, LABIRON, FERRITIN, Z1FMPUP, TSH, FOLATE in the last 48194 hours.    Invalid input(s): VITB12      PAST MEDICAL HISTORY:  ALLERGIES:  Allergies   Allergen Reactions   • Keflex [Cephalexin] Anaphylaxis     CURRENT MEDICATIONS:  Outpatient Encounter Medications as of 1/23/2023   Medication Sig Dispense Refill   • acetaminophen (Tylenol) 325 MG tablet Take 3 tablets by mouth Every 8 (Eight) Hours. Take every 8 hours for 3 days then take prn as needed. 100 tablet 2   • Ascorbic Acid (VITAMIN C PO) Take 8 tablets by mouth Daily.     • B Complex Vitamins (vitamin b complex) capsule capsule Take 1 capsule by mouth Daily.     • Beta Glucan powder Take 3 capsules by mouth Daily.     • ibuprofen (Motrin IB) 200 MG tablet Take 3 tablets by mouth Every 8 (Eight) Hours. Take every 8 hours for three days then take as needed. 100 tablet 2   • NON FORMULARY Take  by mouth Daily. 1 scoop of magnesium dietary supplement powder mixed in liquid.     • Probiotic Product (PROBIOTIC DAILY PO) Take  by mouth Daily.     • vitamin D3 125 MCG (5000 UT) capsule capsule Take 1 capsule by mouth Daily.     • vitamin E 200 UNIT capsule Take 1 capsule by mouth Daily.     • [DISCONTINUED] acetaminophen (TYLENOL) 160 MG/5ML solution Take 15 mg/kg by mouth Every 4 (Four) Hours As Needed for Mild Pain.     • [DISCONTINUED] ondansetron (Zofran) 4 MG tablet Take 1 tablet by mouth Every 8 (Eight) Hours As Needed for Nausea or Vomiting. 15 tablet 0   • [DISCONTINUED] ondansetron (Zofran) 4 MG tablet Take 1 tablet by mouth Every 8 (Eight) Hours As Needed for Nausea or Vomiting. 15 tablet 0   • [DISCONTINUED] oxyCODONE (Roxicodone) 5 MG immediate release tablet Take 1 tablet by mouth Every 8 (Eight) Hours As Needed for  Severe Pain. 15 tablet 0   • [DISCONTINUED] oxyCODONE (Roxicodone) 5 MG immediate release tablet Take 1 tablet by mouth Every 8 (Eight) Hours As Needed for Severe Pain. 15 tablet 0   • [DISCONTINUED] oxyCODONE (Roxicodone) 5 MG immediate release tablet Take 1 tablet by mouth Every 8 (Eight) Hours As Needed for Severe Pain. 10 tablet 0     No facility-administered encounter medications on file as of 2023.     ADULT ILLNESSES:  Patient Active Problem List   Diagnosis Code   • Mass of right breast N63.10   • 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 (HCC) C50.611   • 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, unspecified estrogen receptor status (HCC) C50.611     SURGERIES:  Past Surgical History:   Procedure Laterality Date   •  SECTION     • GALLBLADDER SURGERY     • MASTECTOMY Right 2022    Procedure: RIGHT BREAST MASTECTOMY MODIFIED RADICAL WITH WOUND VAC PLACEMENT;  Surgeon: Griselda Arteaga MD;  Location: [facility];  Service: General;  Laterality: Right;   • TONSILLECTOMY       HEALTH MAINTENANCE ITEMS:  Health Maintenance Due   Topic Date Due   • DXA SCAN  Never done   • COLORECTAL CANCER SCREENING  Never done   • COVID-19 Vaccine (1) Never done   • TDAP/TD VACCINES (1 - Tdap) Never done   • ZOSTER VACCINE (1 of 2) Never done   • Pneumococcal Vaccine 65+ (1 - PCV) Never done   • INFLUENZA VACCINE  Never done   • HEPATITIS C SCREENING  Never done   • ANNUAL WELLNESS VISIT  Never done       <no information>  Last Completed Colonoscopy     This patient has no relevant Health Maintenance data.          There is no immunization history on file for this patient.  Last Completed Mammogram     This patient has no relevant Health Maintenance data.            FAMILY HISTORY:  No family history on file.  SOCIAL HISTORY:  Social History     Socioeconomic History   • Marital status:    Tobacco Use   • Smoking status: Never   • Smokeless tobacco: Never   Vaping  "Use   • Vaping Use: Never used   Substance and Sexual Activity   • Alcohol use: Never   • Drug use: Never   • Sexual activity: Never       REVIEW OF SYSTEMS:  Review of Systems   Constitutional: Negative for fatigue, fever and unexpected weight loss.        \"I feel good.\"   HENT: Negative for congestion, mouth sores and trouble swallowing.    Eyes: Negative for redness and visual disturbance.   Respiratory: Negative for cough, shortness of breath and wheezing.    Cardiovascular: Negative for chest pain and leg swelling.   Gastrointestinal: Negative for abdominal pain, nausea and vomiting.   Endocrine: Negative for polydipsia.   Genitourinary: Negative for difficulty urinating, dysuria and flank pain.   Musculoskeletal: Negative for gait problem and neck stiffness.   Skin: Negative for pallor.   Allergic/Immunologic: Negative for food allergies.   Neurological: Negative for speech difficulty, weakness and confusion.   Hematological: Positive for adenopathy. Does not bruise/bleed easily.   Psychiatric/Behavioral: Negative for agitation and depressed mood.       /88   Pulse 90   Temp 98.1 °F (36.7 °C)   Resp 18   Ht 152 cm (59.84\")   Wt 72 kg (158 lb 11.2 oz)   SpO2 96%   Breastfeeding No   BMI 31.16 kg/m²  Body surface area is 1.69 meters squared.  Pain Score    01/23/23 1530   PainSc: 0-No pain       Physical Exam:  Physical Exam  Vitals reviewed.   Constitutional:       General: She is not in acute distress.  HENT:      Head: Normocephalic and atraumatic.   Eyes:      General: No scleral icterus.  Cardiovascular:      Rate and Rhythm: Normal rate.   Pulmonary:      Effort: No respiratory distress.      Breath sounds: No wheezing or rales.      Comments: No breast exam per patient.  Abdominal:      General: Bowel sounds are normal.      Palpations: Abdomen is soft.      Tenderness: There is no abdominal tenderness.   Musculoskeletal:         General: No swelling.      Cervical back: Neck supple. " "  Skin:     General: Skin is warm.      Coloration: Skin is not pale.   Neurological:      Mental Status: She is alert and oriented to person, place, and time.   Psychiatric:         Mood and Affect: Mood normal.         Behavior: Behavior normal.         Tiffanie ePlayo reports a pain score of 0.         ASSESSMENT:  1.  Breast cancer, right.  Positive for lymphovascular and dermal lymphatic invasion.  Margin positive, posterior, superior and inferior.  AJCC stage:IV (mpT4d, pN1a, cM1, G3).  Tumor size 10.9 cm.  Receptor status: Estrogen 97%, progesterone 95% and HER2/erasmo 2+.  Treatment status: Post right modified radical mastectomy 11/16/2022.  Declined radiation and chemotherapy.  Consider antiestrogen therapy and CDK4/6 inhibitor.   2.  Performance status of 0.  3.  Right iliac bone metastasis.  4.  Mediastinal node metastasis.  5.  Contralateral axillary node metastasis.  6.  Healthcare avoidance        PLAN:  1.   regarding the reason for the referral. She has an incurable disease. Patient stated no treatment at this point. \"I want to read about the Femara and Ibrance. I will talk to my homeopathic provider. I will let you know.\"  2.   regarding role of palliative antiestrogen with CDK 4/6 inhibitor.  3.   regarding role of denosumab for bone metastasis.  Patient aware of potential osteonecrosis of the jaw.  4.   regarding her poor overall prognosis.  She has incurable disease  5.  Cancel CBC with differential and CMP.  6.  eRx Ibrance 125 mg once daily for 21 days, followed by 7 days off every 28 day cycle, #21 with 1 refill.  Monitor for adverse reactions specially cytopenias, fatigue, alopecia, rash, pulmonary toxicity and elevated liver function test.  7.  eRx letrozole 2.5 mg p.o. daily #90 with 1 refill.  Monitor for hot flashes and bone loss.  8.  eRx Zofran 8 mg p.o. every 8 hours as needed for nausea and vomiting #60, 2 refills.  9.  eRx Compazine 10 mg p.o. every 4 hours as " needed for nausea and vomiting #60, 2 refills.  10. Order denosumab 120 mg subcutaneous every 28 days.  Monitor for osteonecrosis of the jaw.  11.  Weekly CBC with differential when she starts Ibrance.  12.  Advance Care Planning   ACP discussion was held with the patient during this visit. Patient has an advance directive (not in EMR), copy requested.  13.  Continue care per primary care physician at the specialist  14.  Plan of care discussed with patient.  Understand expressed.  Patient agreeable to proceed.  15.  Cancel pathology to request HER2/erasmo by FISH. She declined active therapy.   16.  Return to office in 8 weeks with CBC with differential and CMP, same day.  17.  Further recommendations pending.      Thank you for the referral.      I have reviewed the assessment and plan and verified the accuracy of it. No changes to assessment and plan since the information was documented. Abdulaziz Gar MD 01/23/23       I spent 65 total minutes, face-to-face, caring for Tiffanie today.  Greater than 50% of this time involved counseling and/or coordination of care as documented within this note regarding the patient's illness(es), pros and cons of various treatment options, instructions and/or risk reduction.          MD Cody Ortiz APRN

## 2023-01-24 ENCOUNTER — OFFICE VISIT (OUTPATIENT)
Dept: WOUND CARE | Facility: HOSPITAL | Age: 78
End: 2023-01-24
Payer: MEDICARE

## 2023-01-24 DIAGNOSIS — S21.001D UNSPECIFIED OPEN WOUND OF RIGHT BREAST, SUBSEQUENT ENCOUNTER: ICD-10-CM

## 2023-01-24 DIAGNOSIS — G89.18 OTHER ACUTE POSTPROCEDURAL PAIN: ICD-10-CM

## 2023-01-24 DIAGNOSIS — Z48.89 ENCOUNTER FOR OTHER SPECIFIED SURGICAL AFTERCARE: ICD-10-CM

## 2023-01-24 DIAGNOSIS — C50.611 MALIGNANT NEOPLASM OF AXILLARY TAIL OF RIGHT FEMALE BREAST, UNSPECIFIED ESTROGEN RECEPTOR STATUS: ICD-10-CM

## 2023-01-24 PROCEDURE — 99213 OFFICE O/P EST LOW 20 MIN: CPT | Performed by: NURSE PRACTITIONER

## 2023-01-24 PROCEDURE — G0463 HOSPITAL OUTPT CLINIC VISIT: HCPCS

## 2023-01-30 ENCOUNTER — OFFICE VISIT (OUTPATIENT)
Dept: WOUND CARE | Facility: HOSPITAL | Age: 78
End: 2023-01-30
Payer: MEDICARE

## 2023-01-30 DIAGNOSIS — S21.001D UNSPECIFIED OPEN WOUND OF RIGHT BREAST, SUBSEQUENT ENCOUNTER: ICD-10-CM

## 2023-01-30 DIAGNOSIS — G89.18 OTHER ACUTE POSTPROCEDURAL PAIN: ICD-10-CM

## 2023-01-30 DIAGNOSIS — Z48.89 ENCOUNTER FOR OTHER SPECIFIED SURGICAL AFTERCARE: ICD-10-CM

## 2023-01-30 DIAGNOSIS — C50.611 MALIGNANT NEOPLASM OF AXILLARY TAIL OF RIGHT FEMALE BREAST, UNSPECIFIED ESTROGEN RECEPTOR STATUS: ICD-10-CM

## 2023-01-30 PROCEDURE — 17250 CHEM CAUT OF GRANLTJ TISSUE: CPT | Performed by: NURSE PRACTITIONER

## 2023-01-30 PROCEDURE — 17250 CHEM CAUT OF GRANLTJ TISSUE: CPT

## 2023-02-06 ENCOUNTER — OFFICE VISIT (OUTPATIENT)
Dept: WOUND CARE | Facility: HOSPITAL | Age: 78
End: 2023-02-06
Payer: MEDICARE

## 2023-02-06 DIAGNOSIS — Z48.89 ENCOUNTER FOR OTHER SPECIFIED SURGICAL AFTERCARE: ICD-10-CM

## 2023-02-06 DIAGNOSIS — C50.611 MALIGNANT NEOPLASM OF AXILLARY TAIL OF RIGHT FEMALE BREAST, UNSPECIFIED ESTROGEN RECEPTOR STATUS: ICD-10-CM

## 2023-02-06 DIAGNOSIS — G89.18 OTHER ACUTE POSTPROCEDURAL PAIN: ICD-10-CM

## 2023-02-06 DIAGNOSIS — S21.001D UNSPECIFIED OPEN WOUND OF RIGHT BREAST, SUBSEQUENT ENCOUNTER: ICD-10-CM

## 2023-02-06 PROCEDURE — 17250 CHEM CAUT OF GRANLTJ TISSUE: CPT | Performed by: NURSE PRACTITIONER

## 2023-02-06 PROCEDURE — 17250 CHEM CAUT OF GRANLTJ TISSUE: CPT

## 2023-02-13 ENCOUNTER — OFFICE VISIT (OUTPATIENT)
Dept: WOUND CARE | Facility: HOSPITAL | Age: 78
End: 2023-02-13
Payer: MEDICARE

## 2023-02-13 DIAGNOSIS — Z48.89 ENCOUNTER FOR OTHER SPECIFIED SURGICAL AFTERCARE: ICD-10-CM

## 2023-02-13 DIAGNOSIS — C50.611 MALIGNANT NEOPLASM OF AXILLARY TAIL OF RIGHT FEMALE BREAST, UNSPECIFIED ESTROGEN RECEPTOR STATUS: ICD-10-CM

## 2023-02-13 DIAGNOSIS — G89.18 OTHER ACUTE POSTPROCEDURAL PAIN: ICD-10-CM

## 2023-02-13 DIAGNOSIS — S21.001D UNSPECIFIED OPEN WOUND OF RIGHT BREAST, SUBSEQUENT ENCOUNTER: ICD-10-CM

## 2023-02-13 PROCEDURE — 11042 DBRDMT SUBQ TIS 1ST 20SQCM/<: CPT | Performed by: NURSE PRACTITIONER

## 2023-02-20 ENCOUNTER — OFFICE VISIT (OUTPATIENT)
Dept: WOUND CARE | Facility: HOSPITAL | Age: 78
End: 2023-02-20
Payer: MEDICARE

## 2023-02-20 DIAGNOSIS — C50.611 MALIGNANT NEOPLASM OF AXILLARY TAIL OF RIGHT FEMALE BREAST, UNSPECIFIED ESTROGEN RECEPTOR STATUS: ICD-10-CM

## 2023-02-20 DIAGNOSIS — S21.001D UNSPECIFIED OPEN WOUND OF RIGHT BREAST, SUBSEQUENT ENCOUNTER: ICD-10-CM

## 2023-02-20 DIAGNOSIS — Z48.89 ENCOUNTER FOR OTHER SPECIFIED SURGICAL AFTERCARE: ICD-10-CM

## 2023-02-20 DIAGNOSIS — G89.18 OTHER ACUTE POSTPROCEDURAL PAIN: ICD-10-CM

## 2023-02-20 PROCEDURE — 17250 CHEM CAUT OF GRANLTJ TISSUE: CPT | Performed by: NURSE PRACTITIONER

## 2023-02-20 PROCEDURE — 17250 CHEM CAUT OF GRANLTJ TISSUE: CPT

## 2023-02-27 ENCOUNTER — OFFICE VISIT (OUTPATIENT)
Dept: WOUND CARE | Facility: HOSPITAL | Age: 78
End: 2023-02-27
Payer: MEDICARE

## 2023-02-27 DIAGNOSIS — C50.611 MALIGNANT NEOPLASM OF AXILLARY TAIL OF RIGHT FEMALE BREAST, UNSPECIFIED ESTROGEN RECEPTOR STATUS: ICD-10-CM

## 2023-02-27 DIAGNOSIS — Z48.89 ENCOUNTER FOR OTHER SPECIFIED SURGICAL AFTERCARE: ICD-10-CM

## 2023-02-27 DIAGNOSIS — S21.001D UNSPECIFIED OPEN WOUND OF RIGHT BREAST, SUBSEQUENT ENCOUNTER: ICD-10-CM

## 2023-02-27 PROCEDURE — G0463 HOSPITAL OUTPT CLINIC VISIT: HCPCS

## 2023-02-27 PROCEDURE — 99213 OFFICE O/P EST LOW 20 MIN: CPT | Performed by: NURSE PRACTITIONER

## 2023-03-06 ENCOUNTER — OFFICE VISIT (OUTPATIENT)
Dept: SURGERY | Facility: CLINIC | Age: 78
End: 2023-03-06
Payer: MEDICARE

## 2023-03-06 VITALS
OXYGEN SATURATION: 99 % | WEIGHT: 158.73 LBS | BODY MASS INDEX: 31.16 KG/M2 | HEIGHT: 60 IN | HEART RATE: 137 BPM | DIASTOLIC BLOOD PRESSURE: 74 MMHG | SYSTOLIC BLOOD PRESSURE: 128 MMHG

## 2023-03-06 DIAGNOSIS — E66.09 CLASS 1 OBESITY DUE TO EXCESS CALORIES WITH BODY MASS INDEX (BMI) OF 31.0 TO 31.9 IN ADULT, UNSPECIFIED WHETHER SERIOUS COMORBIDITY PRESENT: ICD-10-CM

## 2023-03-06 DIAGNOSIS — C50.919 METASTATIC BREAST CANCER: Primary | ICD-10-CM

## 2023-03-06 PROCEDURE — 99214 OFFICE O/P EST MOD 30 MIN: CPT | Performed by: STUDENT IN AN ORGANIZED HEALTH CARE EDUCATION/TRAINING PROGRAM

## 2023-03-06 NOTE — PROGRESS NOTES
"Office Established Patient Note:     Referring Provider: No ref. provider found    Chief Complaint   Patient presents with   • Follow-up       Subjective .     History of present illness:  Tiffanie Pelayo is a 77 y.o. female s/p modified radical right mastectomy on 22 with wound vac placement for an ulcerated bleeding breast cancer, Stage IV (mpT4d, pN1a, cM1), ER 97% +, [place] 95% +, Her2 2+. She has known right iliac bone, mediastinal node, and contralateral axillary node metastases. She saw oncology and was prescribed Ibrance and Letrozole. She has decided not to take it. She is taking supplements from a homeopathic doctor in \"[place].\" She feels great and has good energy.     History  Past Medical History:   Diagnosis Date   • Anxiety    ,   Past Surgical History:   Procedure Laterality Date   •  SECTION     • GALLBLADDER SURGERY     • MASTECTOMY Right 2022    Procedure: RIGHT BREAST MASTECTOMY MODIFIED RADICAL WITH WOUND VAC PLACEMENT;  Surgeon: Griselda Arteaga MD;  Location: [facility];  Service: General;  Laterality: Right;   • TONSILLECTOMY     , History reviewed. No pertinent family history.,   Social History     Tobacco Use   • Smoking status: Never   • Smokeless tobacco: Never   Vaping Use   • Vaping Use: Never used   Substance Use Topics   • Alcohol use: Never   • Drug use: Never   , (Not in a hospital admission)   and Allergies:  Keflex [cephalexin]    Current Outpatient Medications:   •  acetaminophen (Tylenol) 325 MG tablet, Take 3 tablets by mouth Every 8 (Eight) Hours. Take every 8 hours for 3 days then take prn as needed., Disp: 100 tablet, Rfl: 2  •  Ascorbic Acid (VITAMIN C PO), Take 8 tablets by mouth Daily., Disp: , Rfl:   •  B Complex Vitamins (vitamin b complex) capsule capsule, Take 1 capsule by mouth Daily., Disp: , Rfl:   •  Beta Glucan powder, Take 3 capsules by mouth Daily., Disp: , Rfl:   •  ibuprofen (Motrin IB) 200 MG tablet, Take 3 tablets by mouth Every 8 (Eight) " "Hours. Take every 8 hours for three days then take as needed., Disp: 100 tablet, Rfl: 2  •  NON FORMULARY, Take  by mouth Daily. 1 scoop of magnesium dietary supplement powder mixed in liquid., Disp: , Rfl:   •  Probiotic Product (PROBIOTIC DAILY PO), Take  by mouth Daily., Disp: , Rfl:   •  vitamin D3 125 MCG (5000 UT) capsule capsule, Take 1 capsule by mouth Daily., Disp: , Rfl:   •  vitamin E 200 UNIT capsule, Take 1 capsule by mouth Daily., Disp: , Rfl:     Objective     Vital Signs   /74 (BP Location: Left arm, Patient Position: Sitting, Cuff Size: Adult)   Pulse (!) 137   Ht 152 cm (59.84\")   Wt 72 kg (158 lb 11.7 oz)   SpO2 99%   BMI 31.16 kg/m²      Physical Exam:  General appearance - alert, well appearing, and in no distress  Mental status - alert, oriented to person, place, and time  Eyes - pupils equal and reactive, extraocular eye movements intact  Neck - supple, no significant adenopathy  Chest - no tachypnea, retractions or cyanosis  Heart - normal rate and regular rhythm  Abdomen - soft, nontender, nondistended, no masses or organomegaly  Neurological - alert, oriented, normal speech, no focal findings or movement disorder noted  Breasts - Right chest wall incision well healed. There are some cutaneous metastases that have been present since pre-op. They are enlarging.     Results Review:    The following data was reviewed by: Griselda Arteaga MD on 03/06/2023:    Progress Notes by Abdulaziz Gar MD (01/23/2023 15:15)    Assessment & Plan       Diagnoses and all orders for this visit:    1. Metastatic breast cancer (HCC) (Primary)    2. Class 1 obesity due to excess calories with body mass index (BMI) of 31.0 to 31.9 in adult, unspecified whether serious comorbidity present       Tiffanie Pelayo is a 77 y.o. female s/p modified radical right mastectomy on 11/16/22 with wound vac placement for an ulcerated bleeding breast cancer, Stage IV (mpT4d, pN1a, cM1), ER 97% +, [place] 95% +, Her2 2+. She " has known right iliac bone, mediastinal node, and contralateral axillary node metastases.  She has decided to decline anti-hormone therapy. She has cutaneous metastases that I am following. She will follow up with me in 3 months.     She has a life threatening chronic problem with metastatic cancer. I have reviewed the note from medical oncology. Her situation is limited by social determinants of health.     BMI is >= 30 and <35. (Class 1 Obesity). The following options were offered after discussion;: weight loss educational material (shared in after visit summary)      Griselda Arteaga MD  03/06/23  15:27 CST

## 2023-03-09 NOTE — PATIENT INSTRUCTIONS

## (undated) DEVICE — TRAP FLD MINIVAC MEGADYNE 100ML

## (undated) DEVICE — DRSNG WND VAC GRANUFOAM SENSATRAC LG

## (undated) DEVICE — KT CANSTR VAC WND W/ISOLYSER SENSATRAC 500CC 10CS

## (undated) DEVICE — GLV SURG SENSICARE W/ALOE PF LF SZ6 STRL

## (undated) DEVICE — 3M™ IOBAN™ 2 ANTIMICROBIAL INCISE DRAPE 6651EZ: Brand: IOBAN™ 2

## (undated) DEVICE — ELECTRD BLD EZ CLN MOD XLNG 2.75IN

## (undated) DEVICE — STERILE (14X122CM) TELESCOPICALLY-FOLDED COVER: Brand: CIV-CLEAR™ TRANSDUCER COVER

## (undated) DEVICE — PAD MAJOR: Brand: MEDLINE INDUSTRIES, INC.

## (undated) DEVICE — SUT SILK 2/0 SH 30IN K833H

## (undated) DEVICE — SUT SILK 2/0 CX1 18IN 737G

## (undated) DEVICE — BAPTIST TURNOVER KIT: Brand: MEDLINE INDUSTRIES, INC.

## (undated) DEVICE — SUT MNCRYL 4/0 PS2 27IN UD MCP426H

## (undated) DEVICE — APPL CHLORAPREP HI/LITE 26ML ORNG

## (undated) DEVICE — 4-PORT MANIFOLD: Brand: NEPTUNE 2

## (undated) DEVICE — ANTIBACTERIAL UNDYED BRAIDED (POLYGLACTIN 910), SYNTHETIC ABSORBABLE SUTURE: Brand: COATED VICRYL

## (undated) DEVICE — SYR CONTRL PRESS/LO FIX/M/LL W/THMB/RNG 10ML

## (undated) DEVICE — NDL HYPO PRECISIONGLIDE REG 25G 1 1/2

## (undated) DEVICE — SUT SILK 2/0 SH CR8 18IN CR8 C012D

## (undated) DEVICE — GLV SURG SENSICARE W/ALOE PF LF 6.5 STRL

## (undated) DEVICE — SYR PRECISIONGLIDE LL 5CC 20X1 1/2IN